# Patient Record
Sex: FEMALE | Race: WHITE | NOT HISPANIC OR LATINO | Employment: OTHER | URBAN - METROPOLITAN AREA
[De-identification: names, ages, dates, MRNs, and addresses within clinical notes are randomized per-mention and may not be internally consistent; named-entity substitution may affect disease eponyms.]

---

## 2017-02-10 ENCOUNTER — HOSPITAL ENCOUNTER (OUTPATIENT)
Dept: RADIOLOGY | Age: 67
Discharge: HOME | End: 2017-02-10
Payer: MEDICARE

## 2017-02-10 DIAGNOSIS — Z12.31 ENCOUNTER FOR SCREENING MAMMOGRAM FOR MALIGNANT NEOPLASM OF BREAST: ICD-10-CM

## 2017-02-10 PROCEDURE — G0202 SCR MAMMO BI INCL CAD: HCPCS

## 2018-01-02 ENCOUNTER — TRANSCRIBE ORDERS (OUTPATIENT)
Dept: ADMINISTRATIVE | Facility: HOSPITAL | Age: 68
End: 2018-01-02

## 2018-01-02 DIAGNOSIS — G43.119 CLASSICAL MIGRAINE WITH INTRACTABLE MIGRAINE: Primary | ICD-10-CM

## 2018-01-11 ENCOUNTER — HOSPITAL ENCOUNTER (OUTPATIENT)
Dept: NON INVASIVE DIAGNOSTICS | Facility: CLINIC | Age: 68
Discharge: HOME | End: 2018-01-11
Payer: MEDICARE

## 2018-01-11 ENCOUNTER — GENERIC CONVERSION - ENCOUNTER (OUTPATIENT)
Dept: OTHER | Facility: OTHER | Age: 68
End: 2018-01-11

## 2018-01-11 DIAGNOSIS — G43.119 CLASSICAL MIGRAINE WITH INTRACTABLE MIGRAINE: ICD-10-CM

## 2018-01-11 PROCEDURE — 93880 EXTRACRANIAL BILAT STUDY: CPT

## 2018-02-14 ENCOUNTER — TRANSCRIBE ORDERS (OUTPATIENT)
Dept: RADIOLOGY | Facility: CLINIC | Age: 68
End: 2018-02-14

## 2018-02-16 ENCOUNTER — HOSPITAL ENCOUNTER (OUTPATIENT)
Dept: RADIOLOGY | Age: 68
Discharge: HOME | End: 2018-02-16
Payer: MEDICARE

## 2018-02-16 DIAGNOSIS — Z12.31 ENCOUNTER FOR SCREENING MAMMOGRAM FOR MALIGNANT NEOPLASM OF BREAST: ICD-10-CM

## 2018-02-16 PROCEDURE — 77067 SCR MAMMO BI INCL CAD: CPT

## 2019-03-08 ENCOUNTER — HOSPITAL ENCOUNTER (OUTPATIENT)
Dept: RADIOLOGY | Age: 69
Discharge: HOME | End: 2019-03-08
Payer: MEDICARE

## 2019-03-08 VITALS — HEIGHT: 68 IN | BODY MASS INDEX: 26.07 KG/M2 | WEIGHT: 172 LBS

## 2019-03-08 DIAGNOSIS — Z12.31 ENCOUNTER FOR SCREENING MAMMOGRAM FOR MALIGNANT NEOPLASM OF BREAST: ICD-10-CM

## 2019-03-08 PROCEDURE — 77067 SCR MAMMO BI INCL CAD: CPT

## 2021-08-25 DIAGNOSIS — E78.5 HYPERLIPIDEMIA, UNSPECIFIED HYPERLIPIDEMIA TYPE: ICD-10-CM

## 2021-08-25 DIAGNOSIS — I10 ESSENTIAL HYPERTENSION: Primary | ICD-10-CM

## 2021-08-25 RX ORDER — METOPROLOL SUCCINATE 25 MG/1
TABLET, EXTENDED RELEASE ORAL
COMMUNITY
Start: 2016-06-29 | End: 2021-08-25 | Stop reason: SDUPTHER

## 2021-08-25 RX ORDER — LOVASTATIN 20 MG/1
20 TABLET ORAL DAILY
Qty: 90 TABLET | Refills: 1 | Status: SHIPPED | OUTPATIENT
Start: 2021-08-25 | End: 2022-02-28 | Stop reason: SDUPTHER

## 2021-08-25 RX ORDER — METOPROLOL SUCCINATE 25 MG/1
25 TABLET, EXTENDED RELEASE ORAL DAILY
Qty: 90 TABLET | Refills: 1 | Status: SHIPPED | OUTPATIENT
Start: 2021-08-25 | End: 2022-02-28 | Stop reason: SDUPTHER

## 2021-08-25 RX ORDER — LOVASTATIN 20 MG/1
1 TABLET ORAL DAILY
COMMUNITY
Start: 2015-06-01 | End: 2021-08-25 | Stop reason: SDUPTHER

## 2021-08-27 ENCOUNTER — TELEPHONE (OUTPATIENT)
Dept: INTERNAL MEDICINE CLINIC | Facility: CLINIC | Age: 71
End: 2021-08-27

## 2021-08-27 NOTE — TELEPHONE ENCOUNTER
Lmom  For pt to call office back  Pt is scheduled for office visit on Monday  Lidia young wanted to see if pt was still having chest pain if so she should go to er

## 2021-08-29 PROBLEM — I10 ESSENTIAL HYPERTENSION: Status: ACTIVE | Noted: 2021-08-29

## 2021-08-29 PROBLEM — E78.2 MIXED HYPERLIPIDEMIA: Status: ACTIVE | Noted: 2021-08-29

## 2021-08-30 ENCOUNTER — VBI (OUTPATIENT)
Dept: ADMINISTRATIVE | Facility: OTHER | Age: 71
End: 2021-08-30

## 2021-08-30 ENCOUNTER — OFFICE VISIT (OUTPATIENT)
Dept: INTERNAL MEDICINE CLINIC | Facility: CLINIC | Age: 71
End: 2021-08-30
Payer: MEDICARE

## 2021-08-30 VITALS
DIASTOLIC BLOOD PRESSURE: 82 MMHG | BODY MASS INDEX: 26.39 KG/M2 | OXYGEN SATURATION: 98 % | SYSTOLIC BLOOD PRESSURE: 130 MMHG | HEIGHT: 69 IN | HEART RATE: 58 BPM | WEIGHT: 178.2 LBS | TEMPERATURE: 98 F

## 2021-08-30 DIAGNOSIS — R73.01 IMPAIRED FASTING GLUCOSE: ICD-10-CM

## 2021-08-30 DIAGNOSIS — E03.4 HYPOTHYROIDISM DUE TO ACQUIRED ATROPHY OF THYROID: ICD-10-CM

## 2021-08-30 DIAGNOSIS — E55.9 VITAMIN D DEFICIENCY: ICD-10-CM

## 2021-08-30 DIAGNOSIS — R00.2 PALPITATIONS: ICD-10-CM

## 2021-08-30 DIAGNOSIS — I10 ESSENTIAL HYPERTENSION: Primary | ICD-10-CM

## 2021-08-30 DIAGNOSIS — F41.9 ANXIETY: ICD-10-CM

## 2021-08-30 DIAGNOSIS — E78.2 MIXED HYPERLIPIDEMIA: ICD-10-CM

## 2021-08-30 PROBLEM — R07.89 ATYPICAL CHEST PAIN: Status: ACTIVE | Noted: 2021-08-30

## 2021-08-30 PROCEDURE — 93000 ELECTROCARDIOGRAM COMPLETE: CPT | Performed by: NURSE PRACTITIONER

## 2021-08-30 PROCEDURE — 99214 OFFICE O/P EST MOD 30 MIN: CPT | Performed by: NURSE PRACTITIONER

## 2021-08-30 RX ORDER — ALPRAZOLAM 0.25 MG/1
0.25 TABLET ORAL 2 TIMES DAILY PRN
Qty: 15 TABLET | Refills: 0 | Status: SHIPPED | OUTPATIENT
Start: 2021-08-30 | End: 2021-12-06

## 2021-08-30 NOTE — ASSESSMENT & PLAN NOTE
BP adequately controlled on current regimen  would recommend continued weight mgmt, avoiding high sodium foods  Continue to check BP periodically in the outpatient setting     Call if SBP remains >150

## 2021-08-30 NOTE — ASSESSMENT & PLAN NOTE
Occurred with palpitations  nonradiating  EKG SB  Refer to cardiology for work up  If recurs would recommend going to ER

## 2021-08-30 NOTE — PATIENT INSTRUCTIONS
Chest Pain   WHAT YOU NEED TO KNOW:   Chest pain can be caused by a range of conditions, from not serious to life-threatening  Chest pain can be a symptom of a digestive problem, such as acid reflux or a stomach ulcer  An anxiety attack or a strong emotion, such as anger, can also cause chest pain  Infection, inflammation, or a fracture in the bones or cartilage in your chest can cause pain or discomfort  Sometimes chest pain or pressure is caused by poor blood flow to your heart (angina)  Chest pain may also be caused by life-threatening conditions such as a heart attack or blood clot in your lungs  DISCHARGE INSTRUCTIONS:   Call your local emergency number (911 in the [address] [facility] [facility]) or have someone call if:   · You have any of the following signs of a heart attack:      ? Squeezing, pressure, or pain in your chest    ? You may  also have any of the following:     § Discomfort or pain in your back, neck, jaw, stomach, or arm    § Shortness of breath    § Nausea or vomiting    § Lightheadedness or a sudden cold sweat      Return to the emergency department if:   · You have chest discomfort that gets worse, even with medicine  · You cough or vomit blood  · Your bowel movements are black or bloody  · You cannot stop vomiting, or it hurts to swallow  Call your doctor if:   · You have questions or concerns about your condition or care  Medicines:   · Medicines  may be given to treat the cause of your chest pain  Examples include pain medicine, anxiety medicine, or medicines to increase blood flow to your heart  · Do not take certain medicines without asking your healthcare provider first   These include NSAIDs, herbal or vitamin supplements, or hormones (estrogen or progestin)  · Take your medicine as directed  Contact your healthcare provider if you think your medicine is not helping or if you have side effects  Tell him or her if you are allergic to any medicine   Keep a list of the medicines, vitamins, and herbs you take  Include the amounts, and when and why you take them  Bring the list or the pill bottles to follow-up visits  Carry your medicine list with you in case of an emergency  Healthy living tips: The following are general healthy guidelines  If the cause of your chest pain is known, your healthcare provider will give you specific guidelines to follow  · Do not smoke  Nicotine and other chemicals in cigarettes and cigars can cause lung and heart damage  Ask your healthcare provider for information if you currently smoke and need help to quit  E-cigarettes or smokeless tobacco still contain nicotine  Talk to your healthcare provider before you use these products  · Choose a variety of healthy foods as often as possible  Include fresh, frozen, or canned fruits and vegetables  Also include low-fat dairy products, fish, chicken (without skin), and lean meats  Your healthcare provider or a dietitian can help you create meal plans  You may need to avoid certain foods or drinks if your pain is caused by a digestion problem  · Lower your sodium (salt) intake  Limit foods that are high in sodium, such as canned foods, salty snacks, and cold cuts  If you add salt when you cook food, do not add more at the table  Choose low-sodium canned foods as much as possible  · Drink plenty of water every day  Water helps your body to control your temperature and blood pressure  Ask your healthcare provider how much water you should drink every day  · Ask about activity  Your healthcare provider will tell you which activities to limit or avoid  Ask when you can drive, return to work, and have sex  Ask about the best exercise plan for you  · Maintain a healthy weight  Ask your healthcare provider what a healthy weight is for you  Ask him or her to help you create a safe weight loss plan if you are overweight  · Ask about vaccines you may need    Get the influenza (flu) vaccine every year as soon as recommended, usually in September or October  You may also need a pneumococcal vaccine to prevent pneumonia  The vaccine is usually given every 5 years, starting at age 72  Your healthcare provider can tell you if should get other vaccines, and when to get them  Follow up with your healthcare provider within 72 hours, or as directed: You may need to return for more tests to find the cause of your chest pain  You may be referred to a specialist, such as a cardiologist or gastroenterologist  Write down your questions so you remember to ask them during your visits  © Copyright [employer] 2021 Information is for End User's use only and may not be sold, redistributed or otherwise used for commercial purposes  All illustrations and images included in CareNotes® are the copyrighted property of [employer]  or Naun Childress  The above information is an  only  It is not intended as medical advice for individual conditions or treatments  Talk to your doctor, nurse or pharmacist before following any medical regimen to see if it is safe and effective for you

## 2021-08-30 NOTE — PROGRESS NOTES
Assessment/Plan:  BMI Counseling: Body mass index is 26 24 kg/m²  The BMI is above normal  Nutrition recommendations include decreasing portion sizes, encouraging healthy choices of fruits and vegetables, consuming healthier snacks and increasing intake of lean protein  Exercise recommendations include moderate physical activity 150 minutes/week, exercising 3-5 times per week and strength training exercises  No pharmacotherapy was ordered  Mixed hyperlipidemia  Check LDL with labs  Cont low fat low cholesterol diet    Essential hypertension  BP adequately controlled on current regimen  would recommend continued weight mgmt, avoiding high sodium foods  Continue to check BP periodically in the outpatient setting  Call if SBP remains >150      Palpitations  EKG with sinus bradycardia  Refer to cardiology for work up    Anxiety  Will give xanax to use sparingly for moderate anxiety  If needs to use frequently will discuss SSRI    Atypical chest pain  Occurred with palpitations  nonradiating  EKG SB  Refer to cardiology for work up  If recurs would recommend going to ER          Problem List Items Addressed This Visit        Cardiovascular and Mediastinum    Essential hypertension - Primary     BP adequately controlled on current regimen  would recommend continued weight mgmt, avoiding high sodium foods  Continue to check BP periodically in the outpatient setting     Call if SBP remains >150           Relevant Orders    CBC and differential    Comprehensive metabolic panel       Other    Mixed hyperlipidemia     Check LDL with labs  Cont low fat low cholesterol diet         Relevant Orders    LDL cholesterol, direct    Palpitations     EKG with sinus bradycardia  Refer to cardiology for work up         Relevant Orders    Ambulatory referral to Cardiology    Anxiety     Will give xanax to use sparingly for moderate anxiety  If needs to use frequently will discuss SSRI         Relevant Medications ALPRAZolam (XANAX) 0 25 mg tablet      Other Visit Diagnoses     Hypothyroidism due to acquired atrophy of thyroid        Relevant Orders    TSH, 3rd generation    Vitamin D deficiency        Relevant Orders    Vitamin D 25 hydroxy    Impaired fasting glucose        Relevant Orders    Hemoglobin A1C    Urinalysis with microscopic            Subjective:      Patient ID: Leatha Sparks is a 70 y o  female  Mj Flannery is here today for complaints of an episode of palpitations and some substernal chest pain  She states that she has been under lot of stress recently due to being named POA of a distant relative  She is attempting to get herself out of the current situation  She states that the episode occurred in the evening and lasted several hours  She did not seek medical attention because she felt it was more related to anxiety  She took half of her 's Klonopin as well as smoked some marijuana and felt that the palpitations resolved and she was able to sleep  She has not had any further recurrence  EKG in the office shows sinus bradycardia  She has not had any recent cardiac workup in due to her age and risk factors I do feel that a Cardiology evaluation is pertinent  I also instructed her that if she did have another episode of this  She should go to the ER for evaluation and not wait  Her blood pressure remains well controlled on her current medication regimen would continue same  Will check an LDL with her lab work  She was provided with a lab slip that she will obtain as soon as possible  I will review those results over the phone  In regards to her anxiety she was provided with a small prescription of Xanax with 15 tablets and 0 refills  If she requires more frequent usage of this, we will discussed SSRI use instead  I did tell her that the benzodiazepine medication can become addictive  She also had complaints of an ocular migraine that also occurred during this time  She did have Dopplers done back in 2018 that had less than 50% bilaterally  She is up-to-date with her vision evaluation  She took ibuprofen and it did resolve      The following portions of the patient's history were reviewed and updated as appropriate: allergies, current medications, past family history, past medical history, past social history, past surgical history and problem list     Review of Systems   Constitutional: Negative for chills and fever  HENT: Negative for ear pain and sore throat  Eyes: Negative for pain and visual disturbance  Respiratory: Negative for cough and shortness of breath  Cardiovascular: Positive for chest pain and palpitations  Gastrointestinal: Negative for abdominal pain and vomiting  Genitourinary: Negative for dysuria and hematuria  Musculoskeletal: Negative for arthralgias and back pain  Skin: Negative for color change and rash  Neurological: Negative for seizures and syncope  Psychiatric/Behavioral: The patient is nervous/anxious  All other systems reviewed and are negative  Objective:      /82 (BP Location: Left arm, Patient Position: Sitting, Cuff Size: Standard)   Pulse 58   Temp 98 °F (36 7 °C) (Tympanic)   Ht 5' 9 09" (1 755 m)   Wt 80 8 kg (178 lb 3 2 oz)   SpO2 98% Comment: room air  BMI 26 24 kg/m²          Physical Exam  Constitutional:       General: She is not in acute distress  Appearance: She is well-developed  HENT:      Head: Normocephalic and atraumatic  Eyes:      General: No scleral icterus  Conjunctiva/sclera: Conjunctivae normal       Pupils: Pupils are equal, round, and reactive to light  Neck:      Trachea: No tracheal deviation  Cardiovascular:      Rate and Rhythm: Normal rate and regular rhythm  Heart sounds: No murmur heard  No friction rub  Pulmonary:      Effort: Pulmonary effort is normal  No respiratory distress  Breath sounds: No stridor  No wheezing or rales     Abdominal: General: Bowel sounds are normal       Palpations: Abdomen is soft  Tenderness: There is no abdominal tenderness  There is no guarding or rebound  Musculoskeletal:         General: Normal range of motion  Cervical back: Neck supple  Lymphadenopathy:      Cervical: No cervical adenopathy  Skin:     General: Skin is warm and dry  Findings: No rash  Neurological:      Mental Status: She is alert and oriented to person, place, and time  Cranial Nerves: No cranial nerve deficit

## 2021-10-27 ENCOUNTER — TELEPHONE (OUTPATIENT)
Dept: INTERNAL MEDICINE CLINIC | Facility: CLINIC | Age: 71
End: 2021-10-27

## 2021-11-03 ENCOUNTER — TELEPHONE (OUTPATIENT)
Dept: INTERNAL MEDICINE CLINIC | Facility: CLINIC | Age: 71
End: 2021-11-03

## 2021-11-12 ENCOUNTER — RA CDI HCC (OUTPATIENT)
Dept: OTHER | Facility: HOSPITAL | Age: 71
End: 2021-11-12

## 2021-11-19 ENCOUNTER — OFFICE VISIT (OUTPATIENT)
Dept: INTERNAL MEDICINE CLINIC | Facility: CLINIC | Age: 71
End: 2021-11-19
Payer: MEDICARE

## 2021-11-19 VITALS
HEIGHT: 69 IN | TEMPERATURE: 97.7 F | DIASTOLIC BLOOD PRESSURE: 80 MMHG | BODY MASS INDEX: 26.16 KG/M2 | WEIGHT: 176.6 LBS | SYSTOLIC BLOOD PRESSURE: 120 MMHG | HEART RATE: 84 BPM | OXYGEN SATURATION: 98 %

## 2021-11-19 DIAGNOSIS — F41.9 ANXIETY: ICD-10-CM

## 2021-11-19 DIAGNOSIS — E78.2 MIXED HYPERLIPIDEMIA: ICD-10-CM

## 2021-11-19 DIAGNOSIS — Z11.59 NEED FOR HEPATITIS C SCREENING TEST: ICD-10-CM

## 2021-11-19 DIAGNOSIS — Z23 NEED FOR VACCINATION WITH 13-POLYVALENT PNEUMOCOCCAL CONJUGATE VACCINE: ICD-10-CM

## 2021-11-19 DIAGNOSIS — R00.2 PALPITATIONS: ICD-10-CM

## 2021-11-19 DIAGNOSIS — I10 ESSENTIAL HYPERTENSION: ICD-10-CM

## 2021-11-19 DIAGNOSIS — M85.88 OSTEOPENIA OF LUMBAR SPINE: ICD-10-CM

## 2021-11-19 PROCEDURE — 99214 OFFICE O/P EST MOD 30 MIN: CPT | Performed by: NURSE PRACTITIONER

## 2021-11-19 PROCEDURE — 90670 PCV13 VACCINE IM: CPT

## 2021-11-19 PROCEDURE — G0009 ADMIN PNEUMOCOCCAL VACCINE: HCPCS

## 2021-12-06 ENCOUNTER — CONSULT (OUTPATIENT)
Dept: CARDIOLOGY CLINIC | Facility: CLINIC | Age: 71
End: 2021-12-06
Payer: MEDICARE

## 2021-12-06 VITALS
BODY MASS INDEX: 26.25 KG/M2 | HEIGHT: 69 IN | WEIGHT: 177.2 LBS | SYSTOLIC BLOOD PRESSURE: 122 MMHG | HEART RATE: 76 BPM | DIASTOLIC BLOOD PRESSURE: 86 MMHG

## 2021-12-06 DIAGNOSIS — E78.2 MIXED HYPERLIPIDEMIA: ICD-10-CM

## 2021-12-06 DIAGNOSIS — I10 ESSENTIAL HYPERTENSION: Primary | ICD-10-CM

## 2021-12-06 DIAGNOSIS — R00.2 PALPITATIONS: ICD-10-CM

## 2021-12-06 PROCEDURE — 93000 ELECTROCARDIOGRAM COMPLETE: CPT | Performed by: INTERNAL MEDICINE

## 2021-12-06 PROCEDURE — 99204 OFFICE O/P NEW MOD 45 MIN: CPT | Performed by: INTERNAL MEDICINE

## 2022-01-04 ENCOUNTER — HOSPITAL ENCOUNTER (OUTPATIENT)
Dept: NON INVASIVE DIAGNOSTICS | Facility: CLINIC | Age: 72
Discharge: HOME | End: 2022-01-04
Payer: MEDICARE

## 2022-01-04 VITALS
WEIGHT: 177 LBS | DIASTOLIC BLOOD PRESSURE: 86 MMHG | HEIGHT: 69 IN | SYSTOLIC BLOOD PRESSURE: 122 MMHG | HEART RATE: 76 BPM | BODY MASS INDEX: 26.22 KG/M2

## 2022-01-04 DIAGNOSIS — I10 ESSENTIAL HYPERTENSION: ICD-10-CM

## 2022-01-04 DIAGNOSIS — R00.2 PALPITATIONS: ICD-10-CM

## 2022-01-04 LAB
AORTIC ROOT: 2.9 CM
APICAL FOUR CHAMBER EJECTION FRACTION: 68 %
ASCENDING AORTA: 3.5 CM
E WAVE DECELERATION TIME: 367 MS
FRACTIONAL SHORTENING: 42 % (ref 28–44)
INTERVENTRICULAR SEPTUM IN DIASTOLE (PARASTERNAL SHORT AXIS VIEW): 0.9 CM
LEFT ATRIUM AREA SYSTOLE SINGLE PLANE A4C: 13.1 CM2
LEFT INTERNAL DIMENSION IN SYSTOLE: 3.2 CM (ref 2.1–4)
LEFT VENTRICULAR INTERNAL DIMENSION IN DIASTOLE: 5.5 CM (ref 4.73–7.05)
LEFT VENTRICULAR POSTERIOR WALL IN END DIASTOLE: 0.9 CM
LEFT VENTRICULAR STROKE VOLUME: 78 ML
MV E'TISSUE VEL-SEP: 8 CM/S
MV PEAK A VEL: 0.64 M/S
MV PEAK E VEL: 51 CM/S
MV STENOSIS PRESSURE HALF TIME: 0 MS
RIGHT ATRIUM AREA SYSTOLE A4C: 10.7 CM2
RIGHT VENTRICLE ID DIMENSION: 3.1 CM
SL CV LV EF: 55
SL CV PED ECHO LEFT VENTRICLE DIASTOLIC VOLUME (MOD BIPLANE) 2D: 119 ML
SL CV PED ECHO LEFT VENTRICLE SYSTOLIC VOLUME (MOD BIPLANE) 2D: 40 ML
TR MAX PG: 16 MMHG
TRICUSPID VALVE PEAK REGURGITATION VELOCITY: 2.13 M/S
TRICUSPID VALVE S': 0.5 CM/S
TV PEAK GRADIENT: 18 MMHG
Z-SCORE OF LEFT VENTRICULAR DIMENSION IN END SYSTOLE: -0.48

## 2022-01-04 PROCEDURE — 93306 TTE W/DOPPLER COMPLETE: CPT | Performed by: INTERNAL MEDICINE

## 2022-01-04 PROCEDURE — 93306 TTE W/DOPPLER COMPLETE: CPT

## 2022-02-28 DIAGNOSIS — I10 ESSENTIAL HYPERTENSION: ICD-10-CM

## 2022-02-28 DIAGNOSIS — E78.5 HYPERLIPIDEMIA, UNSPECIFIED HYPERLIPIDEMIA TYPE: ICD-10-CM

## 2022-02-28 RX ORDER — LOVASTATIN 20 MG/1
20 TABLET ORAL DAILY
Qty: 90 TABLET | Refills: 1 | Status: SHIPPED | OUTPATIENT
Start: 2022-02-28

## 2022-02-28 RX ORDER — METOPROLOL SUCCINATE 25 MG/1
25 TABLET, EXTENDED RELEASE ORAL DAILY
Qty: 90 TABLET | Refills: 1 | Status: SHIPPED | OUTPATIENT
Start: 2022-02-28

## 2022-09-12 DIAGNOSIS — I10 ESSENTIAL HYPERTENSION: ICD-10-CM

## 2022-09-12 DIAGNOSIS — E78.5 HYPERLIPIDEMIA, UNSPECIFIED HYPERLIPIDEMIA TYPE: ICD-10-CM

## 2022-09-12 RX ORDER — LOVASTATIN 20 MG/1
20 TABLET ORAL DAILY
Qty: 90 TABLET | Refills: 0 | Status: SHIPPED | OUTPATIENT
Start: 2022-09-12

## 2022-09-12 RX ORDER — METOPROLOL SUCCINATE 25 MG/1
25 TABLET, EXTENDED RELEASE ORAL DAILY
Qty: 90 TABLET | Refills: 0 | Status: SHIPPED | OUTPATIENT
Start: 2022-09-12

## 2022-10-05 ENCOUNTER — RA CDI HCC (OUTPATIENT)
Dept: OTHER | Facility: HOSPITAL | Age: 72
End: 2022-10-05

## 2022-10-05 NOTE — PROGRESS NOTES
[facility]  coding opportunities       Chart reviewed, no opportunity found: CHART REVIEWED, NO OPPORTUNITY FOUND        Patients Insurance        Commercial Insurance: [address] [place] [address]

## 2022-10-31 ENCOUNTER — OFFICE VISIT (OUTPATIENT)
Dept: INTERNAL MEDICINE CLINIC | Facility: CLINIC | Age: 72
End: 2022-10-31

## 2022-10-31 VITALS
WEIGHT: 177.2 LBS | OXYGEN SATURATION: 98 % | HEIGHT: 69 IN | BODY MASS INDEX: 26.25 KG/M2 | HEART RATE: 57 BPM | SYSTOLIC BLOOD PRESSURE: 136 MMHG | DIASTOLIC BLOOD PRESSURE: 80 MMHG | TEMPERATURE: 98.4 F

## 2022-10-31 DIAGNOSIS — F41.9 ANXIETY: ICD-10-CM

## 2022-10-31 DIAGNOSIS — Z23 NEED FOR INFLUENZA VACCINATION: ICD-10-CM

## 2022-10-31 DIAGNOSIS — M85.89 OSTEOPENIA OF MULTIPLE SITES: ICD-10-CM

## 2022-10-31 DIAGNOSIS — I10 ESSENTIAL HYPERTENSION: ICD-10-CM

## 2022-10-31 DIAGNOSIS — Z12.11 SCREENING FOR COLON CANCER: ICD-10-CM

## 2022-10-31 DIAGNOSIS — Z12.31 ENCOUNTER FOR SCREENING MAMMOGRAM FOR MALIGNANT NEOPLASM OF BREAST: Primary | ICD-10-CM

## 2022-10-31 DIAGNOSIS — E78.2 MIXED HYPERLIPIDEMIA: ICD-10-CM

## 2022-10-31 NOTE — ASSESSMENT & PLAN NOTE
Due for updated lipid panel  Continue lovastatin  Recommend healthy lifestyle choices for your cholesterol  Low fat/low cholesterol diet  Limit/avoid red meat  Eat more lean meat - chicken breast, ground turkey, fish  Exercise 30 mins at least 5 times a week as tolerated

## 2022-10-31 NOTE — ASSESSMENT & PLAN NOTE
Well controlled on metoprolol  Continue current regimen -   Continue to monitor blood pressure at home  Goal BP is < 130/80  Contact our office for consistent elevations  Recommend low sodium diet  Exercise 30 minutes 5 times a week as tolerated    Recommend yearly eye exam

## 2022-10-31 NOTE — PROGRESS NOTES
Assessment/Plan:    Anxiety  Uses xanax very rarely  Essential hypertension  Well controlled on metoprolol  Continue current regimen -   Continue to monitor blood pressure at home  Goal BP is < 130/80  Contact our office for consistent elevations  Recommend low sodium diet  Exercise 30 minutes 5 times a week as tolerated  Recommend yearly eye exam        Osteopenia  Follows Rheumatology  Mixed hyperlipidemia  Due for updated lipid panel  Continue lovastatin  Recommend healthy lifestyle choices for your cholesterol  Low fat/low cholesterol diet  Limit/avoid red meat  Eat more lean meat - chicken breast, ground turkey, fish  Exercise 30 mins at least 5 times a week as tolerated  BMI Counseling: Body mass index is 25 89 kg/m²  The BMI is above normal  Nutrition recommendations include decreasing portion sizes, encouraging healthy choices of fruits and vegetables, decreasing fast food intake, consuming healthier snacks, limiting drinks that contain sugar, moderation in carbohydrate intake, increasing intake of lean protein, reducing intake of saturated and trans fat and reducing intake of cholesterol  Exercise recommendations include moderate physical activity 150 minutes/week and exercising 3-5 times per week  Rationale for BMI follow-up plan is due to patient being overweight or obese  Depression Screening and Follow-up Plan: Patient was screened for depression during today's encounter  They screened negative with a PHQ-2 score of 0  Diagnoses and all orders for this visit:    Encounter for screening mammogram for malignant neoplasm of breast  -     Mammo screening bilateral w 3d & cad; Future    Need for influenza vaccination  -     influenza vaccine, high-dose, PF 0 7 mL (FLUZONE HIGH-DOSE)    Screening for colon cancer  -     Ambulatory Referral to Gastroenterology; Future    Mixed hyperlipidemia  -     Lipid panel;  Future    Essential hypertension  -     CBC and differential; Future  -     Comprehensive metabolic panel; Future    Anxiety    Osteopenia of multiple sites          Subjective:      Patient ID: Bry Griffith is a 67 y o  female  Monie Ugalde is here today for follow up  Overall she is doing well with no new concerns  Her labs were reviewed in detail and were all within normal limits  HLD- take lovastatin with no side effects, , due for lipid panel  HTN- controlled on metoprolol, denies CP, headaches and changes of vision, denies side effects with medciation     Osteopenia- follows rheumatology    Due for mammogram she reports she will schedule, will be due for colonoscopy April 2023 script given        The following portions of the patient's history were reviewed and updated as appropriate: allergies, current medications, past family history, past medical history, past social history, past surgical history and problem list     Review of Systems   Constitutional: Negative for activity change, appetite change, chills, diaphoresis and fever  HENT: Negative for congestion, ear discharge, ear pain, postnasal drip, rhinorrhea, sinus pressure, sinus pain and sore throat  Eyes: Negative for pain, discharge, itching and visual disturbance  Respiratory: Negative for cough, chest tightness, shortness of breath and wheezing  Cardiovascular: Negative for chest pain, palpitations and leg swelling  Gastrointestinal: Negative for abdominal pain, constipation, diarrhea, nausea and vomiting  Endocrine: Negative for polydipsia, polyphagia and polyuria  Genitourinary: Negative for difficulty urinating, dysuria and urgency  Musculoskeletal: Negative for arthralgias, back pain and neck pain  Skin: Negative for rash and wound  Neurological: Negative for dizziness, weakness, numbness and headaches           Past Medical History:   Diagnosis Date   • Anxiety disorder    • Basal cell carcinoma     leg and nose   • Basal cell carcinoma (BCC) of back    • Diverticulosis large intestine w/o perforation or abscess w/o bleeding    • Hyperlipidemia    • Hypertension    • Microscopic hematuria    • Osteoarthritis    • Osteopenia of multiple sites    • Retinal tear of left eye          Current Outpatient Medications:   •  Calcium Carb-Cholecalciferol (CALCIUM 1000 + D PO), Take 2,000 mg by mouth, Disp: , Rfl:   •  lovastatin (MEVACOR) 20 mg tablet, Take 1 tablet (20 mg total) by mouth daily, Disp: 90 tablet, Rfl: 0  •  metoprolol succinate (TOPROL-XL) 25 mg 24 hr tablet, Take 1 tablet (25 mg total) by mouth daily, Disp: 90 tablet, Rfl: 0    No Known Allergies    Social History   Past Surgical History:   Procedure Laterality Date   • COLONOSCOPY  04/2018   • HYSTERECTOMY      age 72   • MAMMO (HISTORICAL)  2019   • OOPHORECTOMY Bilateral     age 72   • TONSILLECTOMY       Family History   Problem Relation Age of Onset   • Multiple sclerosis Mother    • Depression Mother    • Completed Suicide  Mother    • Heart disease Father    • Diabetes Father    • Thyroid disease unspecified Sister    • Breast cancer Paternal Grandmother 76   • Hypertension Other    • Arthritis Other         DDD sister       Objective:  /80   Pulse 57   Temp 98 4 °F (36 9 °C) (Temporal)   Ht 5' 9 37" (1 762 m)   Wt 80 4 kg (177 lb 3 2 oz)   SpO2 98%   BMI 25 89 kg/m²     No results found for this or any previous visit (from the past 1344 hour(s))  Physical Exam  Constitutional:       General: She is not in acute distress  Appearance: She is well-developed  She is not diaphoretic  HENT:      Head: Normocephalic and atraumatic  Right Ear: External ear normal       Left Ear: External ear normal       Nose: Nose normal       Mouth/Throat:      Pharynx: No oropharyngeal exudate  Eyes:      General:         Right eye: No discharge  Left eye: No discharge        Conjunctiva/sclera: Conjunctivae normal       Pupils: Pupils are equal, round, and reactive to light    Neck:      Thyroid: No thyromegaly  Cardiovascular:      Rate and Rhythm: Normal rate and regular rhythm  Heart sounds: Normal heart sounds  No murmur heard  No friction rub  No gallop  Pulmonary:      Effort: Pulmonary effort is normal  No respiratory distress  Breath sounds: Normal breath sounds  No stridor  No wheezing or rales  Abdominal:      General: Bowel sounds are normal  There is no distension  Palpations: Abdomen is soft  Tenderness: There is no abdominal tenderness  Musculoskeletal:      Cervical back: Normal range of motion and neck supple  Lymphadenopathy:      Cervical: No cervical adenopathy  Skin:     General: Skin is warm and dry  Findings: No erythema or rash  Neurological:      Mental Status: She is alert and oriented to person, place, and time  Psychiatric:         Behavior: Behavior normal          Thought Content:  Thought content normal          Judgment: Judgment normal

## 2022-12-09 DIAGNOSIS — E78.5 HYPERLIPIDEMIA, UNSPECIFIED HYPERLIPIDEMIA TYPE: ICD-10-CM

## 2022-12-09 DIAGNOSIS — I10 ESSENTIAL HYPERTENSION: ICD-10-CM

## 2022-12-09 RX ORDER — METOPROLOL SUCCINATE 25 MG/1
25 TABLET, EXTENDED RELEASE ORAL DAILY
Qty: 90 TABLET | Refills: 1 | Status: SHIPPED | OUTPATIENT
Start: 2022-12-09

## 2022-12-09 RX ORDER — LOVASTATIN 20 MG/1
20 TABLET ORAL DAILY
Qty: 90 TABLET | Refills: 1 | Status: SHIPPED | OUTPATIENT
Start: 2022-12-09

## 2022-12-18 PROBLEM — M15.0 PRIMARY GENERALIZED (OSTEO)ARTHRITIS: Status: ACTIVE | Noted: 2022-12-18

## 2023-01-30 ENCOUNTER — TELEPHONE (OUTPATIENT)
Age: 73
End: 2023-01-30

## 2023-01-30 DIAGNOSIS — K57.90 DIVERTICULOSIS: Primary | ICD-10-CM

## 2023-01-30 NOTE — TELEPHONE ENCOUNTER
Patient called to schedule recall colonoscopy  Last colonoscopy was done 4/19/2018  with Dr Morales Crenshaw  Patient information was verified, no issues  Please call to schedule

## 2023-01-30 NOTE — LETTER
[facility]  [address] [id number]      FLEXIBLE COLONOSCOPY INSTRUCTIONS  PLEASE NOTE    AS OF JUNE 1, 2014, OUR OFFICE REQUIRES 72 HOURS NOTICE OF CANCELLATION/RESCHEDULE OF A PROCEDURE TO AVOID INCURRING A MISSED APPOINTMENT FEE  Your Colonoscopy Procedure has been scheduled at:  [id number] [place], [place] [phone]    The Date of your Procedure is: May 1, 2023  Dr Miguel Feldman  will be performing the procedure  [facility]  will call the day prior to the procedure with the arrival and procedure times  The total time at the facility will be approximately 1 1/2 hours  Please bring to your procedure at [facility] :   1  Insurance Cards and referrals if required by [employer] company   2  Valid Photo ID   3  Power of  Form if required  Use the bowel preparation as directed  Check with your family doctor if you are taking a blood thinner (Coumadin, Plavix, Xarelto, Pradaxa, Gingko biloba, Ginseng, Feverfew, St  Jonathan's Wort)  We suggest stopping these for 3 days  Special instructions may be needed if you are taking aspirin or any aspirin-containing medication  Check with your family physician  If you are on DIABETIC MEDICATION (tablets or insulin) your doctor may make changes in your preparation  Take all medications usual unless otherwise instructed  Feel free to call the physician's office to answer any questions or address any concerns you have regarding your procedure or preparation  A nurse will be happy to assist you  Because anesthesia is given to you during the procedure, the center requires that you be discharged under supervision of an adult to take you home after the procedure is performed  They will also need to sign as a witness on your discharge instructions  Public Transportation such as VAST, BUS, TAXI is Not Acceptable      It is important you notify our office of any insurance changes prior to your procedure and, if necessary, supply us with referrals from your primary care physician  COLONOSCOPY PREPARATION INSTRUCTIONS    Purchase (prescription not required):  · 238 gram bottle of Miralax® (Glycolax®)  · 4 Dulcolax® (Bisacodyl) Laxative Tablets  · 64 oz  bottle of Gatorade® or your preference of a non-carbonated clear liquid - NOT RED OR PURPLE     One Day Prior to Colonoscopy Procedure  · Nothing to eat the day before your procedure, only clear liquids  · It is important that you drink plenty of clear liquids throughout the day to prevent dehydration  Clear Liquids include:  o Water/Iced Tea/Lemonade/Gatorade®/Black Coffee or tea (no milk or creamers  o Soft drinks: orange, ginger ale, cola, Pepsi®, Sprite®, 7Up®  o Jer-Aid® (lemonade or orange flavors only)  o Strained fruit juices without pulp such as apple, white grape, white cranberry  o Jell-O®, lemon, lime or orange (no fruit or toppings)  o Popsicles, [place] Ice (No Ice Cream, sherbets, or fruit bars)  o Chicken or beef bouillon/broth  DO NOT EAT OR DRINK ANYTHING RED OR PURPLE  DO NOT DRINK ANY ALCOHOLIC BEVERAGES  DIABETIC PATIENTS: Consult your physician    At 4:00 pm, take (2) Dulcolax® (Bisacodyl) Laxative Tablets  Swallow the tablets whole with an 8 oz  glass of water  At 8:00 pm, take the additional (2) Dulcolax® (Bisacodyl) Laxative Tablets with 8 oz  of water  The package may direct you not to exceed (2) tablets at any time but for the purpose of this examination, you should take (4) total     Mix the 238 gm of Miralax® in 64 oz  of Gatorade® and shake the solution until the Miralax® is dissolved  You will drink half (32 oz) of this solution this evening, beginning between 4 and 6 o'clock  Drink 8 oz glassfuls at your own pace  It may take several hours to drink the solution  Remember to stay close to toilet facilities      DAY OF COLONOSCOPY PROCEDURE    Five (5) hours before your procedure, drink the other half (32 oz) of the Miralax®/Gatorade® mixture within a two (2) hour period  This may require you to get up very early if you are scheduled for an early procedure  NOTHING IS TO BE TAKEN BY MOUTH 3 HOURS PRIOR TO PROCEDURE  If you use an inhaler, please bring it with you to your procedure

## 2023-02-07 NOTE — TELEPHONE ENCOUNTER
Attempted to contact patient to schedule colonoscopy, no answer  Left v/m requesting call back  Waiting on response from patient        TR 5 yr recall colonoscopy B[facility] 4/19/2018 mild diverticulosis,

## 2023-02-09 NOTE — TELEPHONE ENCOUNTER
TR 5 yr recall colonoscopy BEC 4/19/2018 mild diverticulosis,     BEC  5/1/2023   Mailed information to pt    Additional Information  • Negative: Is this a new patient under the age of 48? • Negative: Is this a patient over the age of 76 that has never had a colonoscopy? • Negative: Is this patient over the age of [de-identified] with a history of cancer and/or polyps? • Negative: Has the patient had a colonoscopy within the last year and when was it? • Negative: Does the patient have a family history of colon or rectal cancer? • Negative: Does the patient experience chest pain or shortness of breath when climbing stairs? • Negative: Does the patient require oxygen support? • Negative: Has the patient had a cardiac procedure within the last year? • Negative: Has the patient had coronary artery disease (CAD) with stents or myocardial infarction in the last three months? • Negative: Does the patient have ongoing cardiac ischemia, severe valve dysfunction, or severe reduction of ejection fraction? • Negative: Has the patient had a stroke or blood clots? • Negative: Has the patient had a transient ischemic attack (TIA) or cerebrovascular accident (CVA) in the last three months? • Negative: Is the patient currently on any blood thinners such as Coumadin, Plavix, Eliquis, Pradaxa, Xarelto, etc?  (Check the patient's current medication list and document reason for taking medication)  • Negative: Has the patient had a knee or hip replacement within the last 6 months that required antibiotic coverage prior to the procedure? • Negative: Advised Patient - Initial Assessment  1  Patient advised that our office requires 72 hours notice for a cancellation of a procedure to avoid incurring a missed appointment fee  2  Asked patient to please contact insurance company to ask how they will be processing the individual claim for the procedure    3  Advised patient that she is welcome to see the doctor in the office prior to the procedure  4  Advised patient to be at the location of the procedure at 30 minutes prior to the scheduled time      Protocols used: CESIA AMB CRC COLONOSCOPY SCHEDULING

## 2023-05-02 ENCOUNTER — TELEPHONE (OUTPATIENT)
Dept: ADMINISTRATIVE | Facility: OTHER | Age: 73
End: 2023-05-02

## 2023-05-02 NOTE — TELEPHONE ENCOUNTER
----- Message from [facility] sent at 5/2/2023  6:52 AM EDT -----  05/02/23 6:52 AM    Hello, our patient Naveen Hay has had CRC: Colonoscopy completed/performed  Please assist in updating the patient chart by making an External outreach to Dr Edgar Chiu facility located in [place]  The date of service is 5/1/2023      Thank you,  [address] [facility]

## 2023-05-02 NOTE — TELEPHONE ENCOUNTER
Upon review of the In Basket request we have found/obtained the documentation  The status of this report is in progress/pending/awaiting final  Due to protocols, we are unable to hold requests for resulting/linking of a pending/ in progress/awaiting final items and are unable to proceed  Any additional questions or concerns should be emailed to the Practice Liaisons via the appropriate education email address, please do not reply via In Basket      Thank you  Gabby Jackson

## 2023-05-11 ENCOUNTER — OFFICE VISIT (OUTPATIENT)
Dept: INTERNAL MEDICINE CLINIC | Facility: CLINIC | Age: 73
End: 2023-05-11

## 2023-05-11 VITALS
WEIGHT: 180.6 LBS | SYSTOLIC BLOOD PRESSURE: 130 MMHG | TEMPERATURE: 98.6 F | DIASTOLIC BLOOD PRESSURE: 80 MMHG | OXYGEN SATURATION: 9 % | HEART RATE: 66 BPM | HEIGHT: 69 IN | BODY MASS INDEX: 26.75 KG/M2

## 2023-05-11 DIAGNOSIS — F41.9 ANXIETY: ICD-10-CM

## 2023-05-11 DIAGNOSIS — I10 ESSENTIAL HYPERTENSION: ICD-10-CM

## 2023-05-11 DIAGNOSIS — R55 VASOVAGAL SYNCOPE: Primary | ICD-10-CM

## 2023-05-11 RX ORDER — ALPRAZOLAM 0.25 MG/1
0.25 TABLET ORAL DAILY PRN
Qty: 30 TABLET | Refills: 0 | Status: SHIPPED | OUTPATIENT
Start: 2023-05-11

## 2023-05-11 NOTE — ASSESSMENT & PLAN NOTE
- situational  - restart xanax 1/2-1 tablet daily as needed  - advised to not combine xanax with alcohol or marijuana   - controlled substance agreement signed today

## 2023-05-11 NOTE — PROGRESS NOTES
Assessment/Plan:    Problem List Items Addressed This Visit        Cardiovascular and Mediastinum    Essential hypertension     - controlled on Toprol XL 25mg daily   - discussed adequate hydration and changing positions slowly            Other    Anxiety     - situational  - restart xanax 1/2-1 tablet daily as needed  - advised to not combine xanax with alcohol or marijuana   - controlled substance agreement signed today          Relevant Medications    ALPRAZolam (XANAX) 0 25 mg tablet    Syncope - Primary     - likely secondary to dehydration, see HPI for detail of event  - occurred after long day of being in the [place] sun, minimal fluids, +alcohol and minimal food  - event occurred over a month ago  She denies any episodes since  - she did strike her head and did not seek medical evaluation until now  Neuro exam is unremarkable  She is asymptomatic  No asa or anticoagulants   - discussed importance of staying well hydrated and eating regularly throughout the day  Change positions slowly while on Toprol XL  - follow up for any new symptoms             BMI Counseling: Body mass index is 26 39 kg/m²  M*Modal software was used to dictate this note  It may contain errors with dictating incorrect words or incorrect spelling  Please contact the provider directly with any questions  Subjective:      Patient ID: Bobby Corona is a 68 y o  female  HPI     Patient presents today with concern for fall over a month ago and increased anxiety  Fall - she traveled to [place] April 3rd over 1 month ago with 2 friends  She states they arrived early around 9am and spent the day walking around in the sun  She had 2 alcoholic drinks and soda, very little to eat throughout the day  They weren't able to check in to their house until 5 pm  She states she was sitting in their house and went to stand up and felt lightheaded   She states she didn't think much of it and continued to walk into the kitchen and then collapsed backwards, hitting her head on a wooden floor  She states she immediately came to  Her friends were there with her  She was able to eat and drink afterwards which improved her symptoms  She states it was very hot while she was down there and started eating more throughout the trip and has had no symptoms since  She states she monitored herself, she had no headache, no vision changes, no weakness or change in speech  Since the fall no episodes of fainting, dizziness or lightheadedness  No chest pain or palpitations    Anxiety - she has a hx of situational anxiety  Recently she feels her anxiety has been worsened  Unfortunately while she was in [place] her friend that she was traveling with had a stroke while they were watching a baseball game  She had to get the paramedics and her friend was admitted to the hospital  She states that ever since she has been home from the trip she has felt her anxiety more heightened  Another good lifelong friend is going through a struggle with breast cancer  She has been on xanax in the past and is asking for a small supply  She is also inquiring about medical marijuana she feels that marijuana has helped her in the past as well  The following portions of the patient's history were reviewed and updated as appropriate: allergies, current medications, past family history, past medical history, past social history, past surgical history and problem list     Review of Systems   Constitutional: Negative for activity change, appetite change, fatigue, fever and unexpected weight change  Eyes: Negative for visual disturbance  Respiratory: Negative for cough, chest tightness, shortness of breath and wheezing  Cardiovascular: Negative for chest pain, palpitations and leg swelling  Musculoskeletal: Negative for neck pain and neck stiffness  Neurological: Positive for syncope (no rucurrent episodes)   Negative for dizziness, speech difficulty, weakness and "light-headedness  Psychiatric/Behavioral: Negative for confusion and dysphoric mood  The patient is nervous/anxious  Past Medical History:   Diagnosis Date   • Anxiety disorder    • Basal cell carcinoma     leg and nose   • Basal cell carcinoma (BCC) of back    • Diverticulosis large intestine w/o perforation or abscess w/o bleeding    • Hyperlipidemia    • Hypertension    • Microscopic hematuria    • Osteoarthritis    • Osteopenia of multiple sites    • Retinal tear of left eye          Current Outpatient Medications:   •  ALPRAZolam (XANAX) 0 25 mg tablet, Take 1 tablet (0 25 mg total) by mouth daily as needed for anxiety Or 1/2 tablet, Disp: 30 tablet, Rfl: 0  •  Calcium Carb-Cholecalciferol (CALCIUM 1000 + D PO), Take 2,000 mg by mouth, Disp: , Rfl:   •  lovastatin (MEVACOR) 20 mg tablet, Take 1 tablet (20 mg total) by mouth daily, Disp: 90 tablet, Rfl: 1  •  metoprolol succinate (TOPROL-XL) 25 mg 24 hr tablet, Take 1 tablet (25 mg total) by mouth daily, Disp: 90 tablet, Rfl: 1    No Known Allergies    Social History   Past Surgical History:   Procedure Laterality Date   • COLONOSCOPY  04/2018   • HYSTERECTOMY      age 72   • MAMMO (HISTORICAL)  2019   • OOPHORECTOMY Bilateral     age 72   • TONSILLECTOMY       Family History   Problem Relation Age of Onset   • Multiple sclerosis Mother    • Depression Mother    • Completed Suicide  Mother    • Heart disease Father    • Diabetes Father    • Thyroid disease unspecified Sister    • Breast cancer Paternal Grandmother 76   • Hypertension Other    • Arthritis Other         DDD sister       Objective:  /80 (BP Location: Left arm, Patient Position: Sitting, Cuff Size: Standard)   Pulse 66   Temp 98 6 °F (37 °C) (Temporal)   Ht 5' 9 37\" (1 762 m)   Wt 81 9 kg (180 lb 9 6 oz)   SpO2 (!) 9%   BMI 26 39 kg/m²      Physical Exam  Vitals reviewed  Constitutional:       General: She is not in acute distress  Appearance: Normal appearance   She is " normal weight  She is not diaphoretic  HENT:      Head: Normocephalic and atraumatic  Eyes:      Extraocular Movements: Extraocular movements intact  Conjunctiva/sclera: Conjunctivae normal       Pupils: Pupils are equal, round, and reactive to light  Neck:      Vascular: No carotid bruit  Cardiovascular:      Rate and Rhythm: Normal rate and regular rhythm  Heart sounds: Normal heart sounds  No murmur heard  Pulmonary:      Effort: Pulmonary effort is normal  No respiratory distress  Breath sounds: Normal breath sounds  No wheezing, rhonchi or rales  Musculoskeletal:      Cervical back: Neck supple  Neurological:      Mental Status: She is alert and oriented to person, place, and time  Mental status is at baseline  Cranial Nerves: Cranial nerves 2-12 are intact  No cranial nerve deficit, dysarthria or facial asymmetry  Motor: Tremor (chronic essential tremor) present  No weakness, abnormal muscle tone or pronator drift  Coordination: Coordination is intact  Romberg sign negative  Coordination normal  Finger-Nose-Finger Test and Heel to [facility] [[email or web address]] Test normal  Rapid alternating movements normal       Gait: Gait is intact  Gait and tandem walk normal    Psychiatric:         Mood and Affect: Mood normal          Behavior: Behavior normal          Thought Content:  Thought content normal          Judgment: Judgment normal

## 2023-05-11 NOTE — ASSESSMENT & PLAN NOTE
- likely secondary to dehydration, see HPI for detail of event  - occurred after long day of being in the [place] sun, minimal fluids, +alcohol and minimal food  - event occurred over a month ago  She denies any episodes since  - she did strike her head and did not seek medical evaluation until now  Neuro exam is unremarkable  She is asymptomatic  No asa or anticoagulants   - discussed importance of staying well hydrated and eating regularly throughout the day   Change positions slowly while on Toprol XL  - follow up for any new symptoms

## 2023-06-22 DIAGNOSIS — E78.5 HYPERLIPIDEMIA, UNSPECIFIED HYPERLIPIDEMIA TYPE: ICD-10-CM

## 2023-06-22 DIAGNOSIS — I10 ESSENTIAL HYPERTENSION: ICD-10-CM

## 2023-06-22 RX ORDER — LOVASTATIN 20 MG/1
20 TABLET ORAL DAILY
Qty: 90 TABLET | Refills: 1 | Status: SHIPPED | OUTPATIENT
Start: 2023-06-22

## 2023-06-22 RX ORDER — METOPROLOL SUCCINATE 25 MG/1
25 TABLET, EXTENDED RELEASE ORAL DAILY
Qty: 90 TABLET | Refills: 1 | Status: SHIPPED | OUTPATIENT
Start: 2023-06-22

## 2023-08-21 ENCOUNTER — TELEPHONE (OUTPATIENT)
Dept: ADMINISTRATIVE | Facility: OTHER | Age: 73
End: 2023-08-21

## 2023-08-21 NOTE — TELEPHONE ENCOUNTER
Upon review of the In Basket request we were able to locate, review, and update the patient chart as requested for Mammogram.    Any additional questions or concerns should be emailed to the Practice Liaisons via the appropriate education email address, please do not reply via In Basket.     Thank you  Aamir Bolaños MA

## 2023-08-21 NOTE — TELEPHONE ENCOUNTER
----- Message from [facility] sent at 8/18/2023  2:51 PM EDT -----  08/18/23 2:52 PM    Hello, our patient Humaira Calhoun has had Mammogram completed/performed. Please assist in updating the patient chart by pulling the Care Everywhere (CE) document. The date of service is 2023.      Thank you,  [address] [place] [address]

## 2023-09-19 DIAGNOSIS — E78.5 HYPERLIPIDEMIA, UNSPECIFIED HYPERLIPIDEMIA TYPE: ICD-10-CM

## 2023-09-19 DIAGNOSIS — I10 ESSENTIAL HYPERTENSION: ICD-10-CM

## 2023-09-19 RX ORDER — METOPROLOL SUCCINATE 25 MG/1
25 TABLET, EXTENDED RELEASE ORAL DAILY
Qty: 90 TABLET | Refills: 0 | OUTPATIENT
Start: 2023-09-19

## 2023-09-19 RX ORDER — LOVASTATIN 20 MG/1
20 TABLET ORAL DAILY
Qty: 90 TABLET | Refills: 0 | OUTPATIENT
Start: 2023-09-19

## 2023-09-19 NOTE — TELEPHONE ENCOUNTER
Last office visit 5/11  Next Office Visit not scheduled. Tried to call patient could not get through or leave a message. She would like prescription refills for lovastatin and metoprolol sent to [facility] [address]

## 2023-09-21 RX ORDER — METOPROLOL SUCCINATE 25 MG/1
25 TABLET, EXTENDED RELEASE ORAL DAILY
Qty: 90 TABLET | Refills: 0 | Status: SHIPPED | OUTPATIENT
Start: 2023-09-21

## 2023-09-21 RX ORDER — LOVASTATIN 20 MG/1
20 TABLET ORAL DAILY
Qty: 90 TABLET | Refills: 0 | Status: SHIPPED | OUTPATIENT
Start: 2023-09-21

## 2023-09-27 ENCOUNTER — APPOINTMENT (OUTPATIENT)
Dept: LAB | Facility: CLINIC | Age: 73
End: 2023-09-27
Payer: MEDICARE

## 2023-09-27 DIAGNOSIS — M85.89 OSTEOPENIA OF MULTIPLE SITES: ICD-10-CM

## 2023-09-27 DIAGNOSIS — E55.9 VITAMIN D DEFICIENCY: ICD-10-CM

## 2023-09-27 LAB
25(OH)D3 SERPL-MCNC: 35.5 NG/ML (ref 30–100)
ANION GAP SERPL CALCULATED.3IONS-SCNC: 4 MMOL/L
BUN SERPL-MCNC: 22 MG/DL (ref 5–25)
CALCIUM SERPL-MCNC: 9.1 MG/DL (ref 8.4–10.2)
CHLORIDE SERPL-SCNC: 107 MMOL/L (ref 96–108)
CO2 SERPL-SCNC: 29 MMOL/L (ref 21–32)
CREAT SERPL-MCNC: 0.8 MG/DL (ref 0.6–1.3)
GFR SERPL CREATININE-BSD FRML MDRD: 73 ML/MIN/1.73SQ M
GLUCOSE SERPL-MCNC: 100 MG/DL (ref 65–140)
POTASSIUM SERPL-SCNC: 4.6 MMOL/L (ref 3.5–5.3)
SODIUM SERPL-SCNC: 140 MMOL/L (ref 135–147)

## 2023-09-27 PROCEDURE — 80048 BASIC METABOLIC PNL TOTAL CA: CPT

## 2023-09-27 PROCEDURE — 82306 VITAMIN D 25 HYDROXY: CPT

## 2023-09-27 PROCEDURE — 36415 COLL VENOUS BLD VENIPUNCTURE: CPT

## 2023-11-27 ENCOUNTER — RA CDI HCC (OUTPATIENT)
Dept: OTHER | Facility: HOSPITAL | Age: 73
End: 2023-11-27

## 2023-12-01 ENCOUNTER — OFFICE VISIT (OUTPATIENT)
Age: 73
End: 2023-12-01
Payer: MEDICARE

## 2023-12-01 VITALS
HEIGHT: 69 IN | DIASTOLIC BLOOD PRESSURE: 80 MMHG | TEMPERATURE: 98.1 F | SYSTOLIC BLOOD PRESSURE: 138 MMHG | WEIGHT: 180 LBS | HEART RATE: 74 BPM | OXYGEN SATURATION: 98 % | BODY MASS INDEX: 26.66 KG/M2

## 2023-12-01 DIAGNOSIS — E78.5 HYPERLIPIDEMIA, UNSPECIFIED HYPERLIPIDEMIA TYPE: ICD-10-CM

## 2023-12-01 DIAGNOSIS — H93.8X3 SENSATION OF FULLNESS IN BOTH EARS: ICD-10-CM

## 2023-12-01 DIAGNOSIS — Z23 ENCOUNTER FOR IMMUNIZATION: ICD-10-CM

## 2023-12-01 DIAGNOSIS — E78.2 MIXED HYPERLIPIDEMIA: ICD-10-CM

## 2023-12-01 DIAGNOSIS — I10 ESSENTIAL HYPERTENSION: Primary | ICD-10-CM

## 2023-12-01 PROBLEM — G25.0 ESSENTIAL TREMOR: Status: ACTIVE | Noted: 2023-12-01

## 2023-12-01 PROCEDURE — 99214 OFFICE O/P EST MOD 30 MIN: CPT | Performed by: NURSE PRACTITIONER

## 2023-12-01 PROCEDURE — 90662 IIV NO PRSV INCREASED AG IM: CPT

## 2023-12-01 PROCEDURE — G0439 PPPS, SUBSEQ VISIT: HCPCS | Performed by: NURSE PRACTITIONER

## 2023-12-01 PROCEDURE — G0008 ADMIN INFLUENZA VIRUS VAC: HCPCS

## 2023-12-01 RX ORDER — METOPROLOL SUCCINATE 25 MG/1
25 TABLET, EXTENDED RELEASE ORAL DAILY
Qty: 90 TABLET | Refills: 1 | Status: SHIPPED | OUTPATIENT
Start: 2023-12-01

## 2023-12-01 RX ORDER — FLUTICASONE PROPIONATE 50 MCG
2 SPRAY, SUSPENSION (ML) NASAL DAILY
COMMUNITY
Start: 2023-11-10 | End: 2023-12-10

## 2023-12-01 RX ORDER — BENZONATATE 200 MG/1
CAPSULE ORAL
COMMUNITY
Start: 2023-11-10 | End: 2023-12-01

## 2023-12-01 RX ORDER — LOVASTATIN 20 MG/1
20 TABLET ORAL DAILY
Qty: 90 TABLET | Refills: 1 | Status: SHIPPED | OUTPATIENT
Start: 2023-12-01

## 2023-12-01 RX ORDER — FLUOROURACIL 50 MG/G
CREAM TOPICAL
COMMUNITY
Start: 2023-10-25

## 2023-12-01 RX ORDER — CYCLOBENZAPRINE HCL 10 MG
TABLET ORAL
COMMUNITY
Start: 2023-09-19 | End: 2023-12-01

## 2023-12-01 NOTE — ASSESSMENT & PLAN NOTE
Due for updated lipid panel. Continue lovastatin. Recommend healthy lifestyle choices for your cholesterol. Low fat/low cholesterol diet. Limit/avoid red meat. Eat more lean meat - chicken breast, ground turkey, fish. Exercise 30 mins at least 5 times a week as tolerated.

## 2023-12-01 NOTE — ASSESSMENT & PLAN NOTE
Well controlled on metoprolol. Continue current regimen -   Continue to monitor blood pressure at home. Goal BP is < 130/80. Contact our office for consistent elevations. Recommend low sodium diet. Exercise 30 minutes 5 times a week as tolerated.   Recommend yearly eye exam.

## 2023-12-01 NOTE — PROGRESS NOTES
Assessment and Plan:     Problem List Items Addressed This Visit          Cardiovascular and Mediastinum    Essential hypertension - Primary     Well controlled on metoprolol. Continue current regimen -   Continue to monitor blood pressure at home. Goal BP is < 130/80. Contact our office for consistent elevations. Recommend low sodium diet. Exercise 30 minutes 5 times a week as tolerated. Recommend yearly eye exam.            Relevant Medications    metoprolol succinate (TOPROL-XL) 25 mg 24 hr tablet    Other Relevant Orders    CBC and differential    Comprehensive metabolic panel    Lipid panel       Nervous and Auditory    Sensation of fullness in both ears     -likely secondary to post nasal drip   -start flonase and zyrtec  -defer chest xray at this time            Other    Mixed hyperlipidemia     Due for updated lipid panel. Continue lovastatin. Recommend healthy lifestyle choices for your cholesterol. Low fat/low cholesterol diet. Limit/avoid red meat. Eat more lean meat - chicken breast, ground turkey, fish. Exercise 30 mins at least 5 times a week as tolerated. Relevant Medications    lovastatin (MEVACOR) 20 mg tablet     Other Visit Diagnoses       Hyperlipidemia, unspecified hyperlipidemia type        Relevant Medications    lovastatin (MEVACOR) 20 mg tablet    Encounter for immunization        Relevant Orders    influenza vaccine, high-dose, PF 0.7 mL (FLUZONE HIGH-DOSE) (Completed)          BMI Counseling: Body mass index is 26.3 kg/m². The BMI is above normal. Nutrition recommendations include decreasing portion sizes, encouraging healthy choices of fruits and vegetables, decreasing fast food intake, consuming healthier snacks, limiting drinks that contain sugar, moderation in carbohydrate intake, increasing intake of lean protein, reducing intake of saturated and trans fat and reducing intake of cholesterol. Exercise recommendations include exercising 3-5 times per week. Rationale for BMI follow-up plan is due to patient being overweight or obese. Depression Screening and Follow-up Plan: Patient was screened for depression during today's encounter. They screened negative with a PHQ-2 score of 0. Preventive health issues were discussed with patient, and age appropriate screening tests were ordered as noted in patient's After Visit Summary. Personalized health advice and appropriate referrals for health education or preventive services given if needed, as noted in patient's After Visit Summary. History of Present Illness:     Patient presents for a Medicare Wellness Visit    Jacinto Rich is here today for follow up. Overall she is doing well with no new concerns. Has had a dry cough for about a month, and a tickle in her throat, was treated with amoxicillin     HLD- take lovastatin with no side effects, , due for lipid panel. HTN- controlled on metoprolol, denies CP, headaches and changes of vision, denies side effects with medciation     Osteopenia- follows rheumatology       Patient Care Team:  LINDA Covarrubias as PCP - General (Family Medicine)  Anuja Enriquez MD     Review of Systems:     Review of Systems   Constitutional:  Negative for activity change, appetite change, chills, diaphoresis and fever. HENT:  Negative for congestion, ear discharge, ear pain, postnasal drip, rhinorrhea, sinus pressure, sinus pain and sore throat. Eyes:  Negative for pain, discharge, itching and visual disturbance. Respiratory:  Positive for cough. Negative for chest tightness, shortness of breath and wheezing. Cardiovascular:  Negative for chest pain, palpitations and leg swelling. Gastrointestinal:  Negative for abdominal pain, constipation, diarrhea, nausea and vomiting. Endocrine: Negative for polydipsia, polyphagia and polyuria. Genitourinary:  Negative for difficulty urinating, dysuria and urgency.    Musculoskeletal:  Negative for arthralgias, back pain and neck pain. Skin:  Negative for rash and wound. Neurological:  Negative for dizziness, weakness, numbness and headaches.         Problem List:     Patient Active Problem List   Diagnosis    Mixed hyperlipidemia    Essential hypertension    Palpitations    Anxiety    Atypical chest pain    Osteopenia    Primary generalized (osteo)arthritis    Syncope    Essential tremor    Sensation of fullness in both ears      Past Medical and Surgical History:     Past Medical History:   Diagnosis Date    Anxiety disorder     Basal cell carcinoma     leg and nose    Basal cell carcinoma (BCC) of back     Diverticulosis large intestine w/o perforation or abscess w/o bleeding     Hyperlipidemia     Hypertension     Microscopic hematuria     Osteoarthritis     Osteopenia of multiple sites     Retinal tear of left eye      Past Surgical History:   Procedure Laterality Date    COLONOSCOPY  2018    HYSTERECTOMY      age 72    MAMMO (HISTORICAL)  2019    OOPHORECTOMY Bilateral     age 72    TONSILLECTOMY        Family History:     Family History   Problem Relation Age of Onset    Multiple sclerosis Mother     Depression Mother     Completed Suicide  Mother     Heart disease Father     Diabetes Father     Thyroid disease unspecified Sister     Breast cancer Paternal Grandmother 76    Hypertension Other     Arthritis Other         DDD sister      Social History:     Social History     Socioeconomic History    Marital status: /Civil Union     Spouse name: None    Number of children: None    Years of education: None    Highest education level: None   Occupational History    None   Tobacco Use    Smoking status: Former     Packs/day: 0.25     Years: 20.00     Total pack years: 5.00     Types: Cigarettes     Start date:      Quit date:      Years since quittin.9    Smokeless tobacco: Never    Tobacco comments:     quit 20 years ago   Vaping Use    Vaping Use: Never used   Substance and Sexual Activity    Alcohol use: Yes     Comment: social wine drinker    Drug use: Never    Sexual activity: None   Other Topics Concern    None   Social History Narrative    None     Social Determinants of Health     Financial Resource Strain: Low Risk  (12/1/2023)    Overall Financial Resource Strain (CARDIA)     Difficulty of Paying Living Expenses: Not hard at all   Food Insecurity: Not on file   Transportation Needs: No Transportation Needs (12/1/2023)    PRAPARE - Transportation     Lack of Transportation (Medical): No     Lack of Transportation (Non-Medical): No   Physical Activity: Not on file   Stress: Not on file   Social Connections: Not on file   Intimate Partner Violence: Not on file   Housing Stability: Not on file      Medications and Allergies:     Current Outpatient Medications   Medication Sig Dispense Refill    Calcium Carb-Cholecalciferol (CALCIUM 1000 + D PO) Take 2,000 mg by mouth      fluorouracil (EFUDEX) 5 % cream Apply TWICE A DAY TO actinic keratoses ON upper LIP FOR 2 WEEKS. fluticasone (FLONASE) 50 mcg/act nasal spray 2 sprays into each nostril daily      lovastatin (MEVACOR) 20 mg tablet Take 1 tablet (20 mg total) by mouth daily 90 tablet 1    metoprolol succinate (TOPROL-XL) 25 mg 24 hr tablet Take 1 tablet (25 mg total) by mouth daily 90 tablet 1     No current facility-administered medications for this visit.      No Known Allergies   Immunizations:     Immunization History   Administered Date(s) Administered    COVID-19 MODERNA VACC 0.5 ML IM 01/05/2021, 02/04/2021, 11/29/2021, 10/03/2022    INFLUENZA 10/29/2020, 10/29/2020, 10/31/2022    Influenza Split High Dose Preservative Free IM 10/28/2020    Influenza, high dose seasonal 0.7 mL 10/31/2022, 12/01/2023    Pneumococcal Conjugate 13-Valent 10/21/2016, 10/21/2016, 11/19/2021      Health Maintenance:         Topic Date Due    Hepatitis C Screening  Never done    Breast Cancer Screening: Mammogram  06/12/2024    Colorectal Cancer Screening  05/01/2030 Topic Date Due    Pneumococcal Vaccine: 65+ Years (2 - PPSV23 if available, else PCV20) 11/19/2022    COVID-19 Vaccine (5 - Moderna series) 11/28/2022      Medicare Screening Tests and Risk Assessments:     Guillermina Lindquist is here for her Subsequent Wellness visit. Last Medicare Wellness visit information reviewed, patient interviewed and updates made to the record today. Health Risk Assessment:   Patient rates overall health as good. Patient feels that their physical health rating is same. Patient is satisfied with their life. Eyesight was rated as slightly worse. Hearing was rated as slightly worse. Patient feels that their emotional and mental health rating is slightly better. Patients states they are never, rarely angry. Patient states they are sometimes unusually tired/fatigued. Pain experienced in the last 7 days has been some. Patient's pain rating has been 2/10. Patient states that she has experienced no weight loss or gain in last 6 months. Depression Screening:   PHQ-2 Score: 0      Fall Risk Screening: In the past year, patient has experienced: no history of falling in past year      Urinary Incontinence Screening:   Patient has leaked urine accidently in the last six months. Home Safety:  Patient does not have trouble with stairs inside or outside of their home. Patient has working smoke alarms and has working carbon monoxide detector. Home safety hazards include: none. Nutrition:   Current diet is Regular, Low Cholesterol and Limited junk food. Medications:   Patient is not currently taking any over-the-counter supplements. Patient is able to manage medications. Activities of Daily Living (ADLs)/Instrumental Activities of Daily Living (IADLs):   Walk and transfer into and out of bed and chair?: Yes  Dress and groom yourself?: Yes    Bathe or shower yourself?: Yes    Feed yourself?  Yes  Do your laundry/housekeeping?: Yes  Manage your money, pay your bills and track your expenses?: Yes  Make your own meals?: Yes    Do your own shopping?: Yes    Previous Hospitalizations:   Any hospitalizations or ED visits within the last 12 months?: No      Advance Care Planning:   Living will: Yes    Durable POA for healthcare: Yes    Advanced directive: Yes    Advanced directive counseling given: Yes    ACP document given: Yes      PREVENTIVE SCREENINGS      Cardiovascular Screening:    General: Screening Not Indicated, History Lipid Disorder and Screening Current      Diabetes Screening:     General: Screening Current      Colorectal Cancer Screening:     General: Screening Current      Breast Cancer Screening:     General: Screening Current      Cervical Cancer Screening:    General: Screening Not Indicated      Osteoporosis Screening:    General: Screening Current      Abdominal Aortic Aneurysm (AAA) Screening:        General: Screening Not Indicated      Lung Cancer Screening:     General: Screening Not Indicated      Hepatitis C Screening:    General: Screening Not Indicated    Screening, Brief Intervention, and Referral to Treatment (SBIRT)    Screening  Typical number of drinks in a day: 1  Typical number of drinks in a week: 6  Interpretation: Low risk drinking behavior. AUDIT-C Screenin) How often did you have a drink containing alcohol in the past year? 2 to 3 times a week  2) How many drinks did you have on a typical day when you were drinking in the past year? 1 to 2  3) How often did you have 6 or more drinks on one occasion in the past year? never    AUDIT-C Score: 3  Interpretation: Score 3-12 (female): POSITIVE screen for alcohol misuse    AUDIT Screenin) How often during the last year have you found that you were not able to stop drinking once you had started?  0 - never  5) How often during the last year have you failed to do what was normally expected from you because of drinking? 0 - never  6) How often during the last year have you needed a first drink in the morning to get yourself going after a heavy drinking session? 0 - never  7) How often during the last year have you had a feeling of guilt or remorse after drinking? 0 - never  8) How often during the last year have you been unable to remember what happened the night before because you had been drinking? 0 - never  9) Have you or someone else been injured as a result of your drinking? 0 - no  10) Has a relative or friend or a doctor or another health worker been concerned about your drinking or suggested you cut down? 0 - no    AUDIT Score: 3  Interpretation: Low risk alcohol consumption    Single Item Drug Screening:  How often have you used an illegal drug (including marijuana) or a prescription medication for non-medical reasons in the past year? never    Single Item Drug Screen Score: 0  Interpretation: Negative screen for possible drug use disorder    Other Counseling Topics:   Car/seat belt/driving safety, skin self-exam, sunscreen and calcium and vitamin D intake and regular weightbearing exercise. No results found. Physical Exam:     /80   Pulse 74   Temp 98.1 °F (36.7 °C) (Temporal)   Ht 5' 9.37" (1.762 m)   Wt 81.6 kg (180 lb)   SpO2 98%   BMI 26.30 kg/m²     Physical Exam  Vitals and nursing note reviewed. Constitutional:       General: She is not in acute distress. Appearance: She is well-developed. HENT:      Head: Normocephalic and atraumatic. Right Ear: Tympanic membrane is bulging. Left Ear: Tympanic membrane is bulging. Eyes:      Conjunctiva/sclera: Conjunctivae normal.   Cardiovascular:      Rate and Rhythm: Normal rate and regular rhythm. Heart sounds: No murmur heard. Pulmonary:      Effort: Pulmonary effort is normal. No respiratory distress. Breath sounds: Normal breath sounds. Abdominal:      Palpations: Abdomen is soft. Tenderness: There is no abdominal tenderness. Musculoskeletal:         General: No swelling.       Cervical back: Neck supple. Skin:     General: Skin is warm and dry. Capillary Refill: Capillary refill takes less than 2 seconds. Neurological:      Mental Status: She is alert.    Psychiatric:         Mood and Affect: Mood normal.          LINDA Lundberg

## 2024-01-25 ENCOUNTER — TELEPHONE (OUTPATIENT)
Age: 74
End: 2024-01-25

## 2024-02-05 ENCOUNTER — OFFICE VISIT (OUTPATIENT)
Age: 74
End: 2024-02-05
Payer: MEDICARE

## 2024-02-05 VITALS
HEIGHT: 69 IN | DIASTOLIC BLOOD PRESSURE: 70 MMHG | TEMPERATURE: 98.2 F | BODY MASS INDEX: 26.98 KG/M2 | OXYGEN SATURATION: 95 % | WEIGHT: 182.2 LBS | SYSTOLIC BLOOD PRESSURE: 130 MMHG | HEART RATE: 83 BPM

## 2024-02-05 DIAGNOSIS — E55.9 VITAMIN D INSUFFICIENCY: ICD-10-CM

## 2024-02-05 DIAGNOSIS — E78.2 MIXED HYPERLIPIDEMIA: ICD-10-CM

## 2024-02-05 DIAGNOSIS — I10 ESSENTIAL HYPERTENSION: ICD-10-CM

## 2024-02-05 DIAGNOSIS — M85.89 OSTEOPENIA OF MULTIPLE SITES: Primary | ICD-10-CM

## 2024-02-05 DIAGNOSIS — M15.0 PRIMARY GENERALIZED (OSTEO)ARTHRITIS: ICD-10-CM

## 2024-02-05 DIAGNOSIS — Z79.899 ENCOUNTER FOR LONG-TERM (CURRENT) USE OF OTHER MEDICATIONS: ICD-10-CM

## 2024-02-05 PROCEDURE — 99213 OFFICE O/P EST LOW 20 MIN: CPT | Performed by: INTERNAL MEDICINE

## 2024-02-05 NOTE — ASSESSMENT & PLAN NOTE
Osteopenia with metabolic work-up unremarkable.  Family history of osteoporosis in her sister.  Patient has no history of adult fracture.  Results of DEXA dated 10/20/2023 with osteopenia.  It was done at a different facility which makes it more difficult to try and compare, however, it does appear to be stable with some suggestion of improvement in the total hip.  No need for prescription medication at this time.  Continue with calcium and vitamin D supplement and home exercise program as tolerated. Follow up 1 year or sooner if needed.

## 2024-02-05 NOTE — ASSESSMENT & PLAN NOTE
Continue medication as ordered.  Follow-up primary care who will continue to monitor lipid profile.  Continue to monitor.

## 2024-02-05 NOTE — ASSESSMENT & PLAN NOTE
Continue medication as ordered.  Continue to monitor blood pressure.  Monitor labs for medication toxicity.  Follow-up primary care.

## 2024-02-05 NOTE — PROGRESS NOTES
Assessment/Plan:    Osteopenia  Osteopenia with metabolic work-up unremarkable.  Family history of osteoporosis in her sister.  Patient has no history of adult fracture.  Results of DEXA dated 10/20/2023 with osteopenia.  It was done at a different facility which makes it more difficult to try and compare, however, it does appear to be stable with some suggestion of improvement in the total hip.  No need for prescription medication at this time.  Continue with calcium and vitamin D supplement and home exercise program as tolerated. Follow up 1 year or sooner if needed.    Primary generalized (osteo)arthritis  Primary generalized osteoarthritis with interphalangeal osteoarthritis hands and feet.  Probable DJD bilateral knees right greater than left.  Questionable history internal derangement right knee with no current sx's.  Encouraged home exercise program.  If patient has significant symptoms, would consider physical therapy.  Continue to monitor.    Mixed hyperlipidemia  Continue medication as ordered.  Follow-up primary care who will continue to monitor lipid profile.  Continue to monitor.    Essential hypertension  Continue medication as ordered.  Continue to monitor blood pressure.  Monitor labs for medication toxicity.  Follow-up primary care.         Problem List Items Addressed This Visit     Mixed hyperlipidemia     Continue medication as ordered.  Follow-up primary care who will continue to monitor lipid profile.  Continue to monitor.         Essential hypertension     Continue medication as ordered.  Continue to monitor blood pressure.  Monitor labs for medication toxicity.  Follow-up primary care.         Osteopenia - Primary     Osteopenia with metabolic work-up unremarkable.  Family history of osteoporosis in her sister.  Patient has no history of adult fracture.  Results of DEXA dated 10/20/2023 with osteopenia.  It was done at a different facility which makes it more difficult to try and compare,  however, it does appear to be stable with some suggestion of improvement in the total hip.  No need for prescription medication at this time.  Continue with calcium and vitamin D supplement and home exercise program as tolerated. Follow up 1 year or sooner if needed.         Relevant Orders    Basic metabolic panel    Primary generalized (osteo)arthritis     Primary generalized osteoarthritis with interphalangeal osteoarthritis hands and feet.  Probable DJD bilateral knees right greater than left.  Questionable history internal derangement right knee with no current sx's.  Encouraged home exercise program.  If patient has significant symptoms, would consider physical therapy.  Continue to monitor.        Other Visit Diagnoses     Encounter for long-term (current) use of other medications        Vitamin D insufficiency        Relevant Orders    Vitamin D 25 hydroxy                Reviewed records, labs, and imaging with the patient in detail.  Counseled patient.  Discussion regarding my findings and recommendations.  Office visit with documentation 25 min.    Subjective:      Patient ID: Shira Galarza is a 74 y.o. female.    Patient with osteopenia on DEXA dated 09/23/2021 significant for spine T-score-1.2 stable from the prior study.  Left total hip-1.8 with a decrease of 6.5% as compared to prior study.  Femoral neck-2.3 with a decrease of 1.1% as compared to prior study.  FRAX risk for hip fracture 3.2%.  FRAX risk for major fracture 14%.  Updated DEXA dated 10/20/2023 was done at a different facility making capacity more difficult.  L1, L2, and L3 T-score -0.9 overall stable.  Total hip -1.2 with a calculated increase of 8%, however difficult to compare as it was done at a different facility.  Femoral neck -2.3.  FRAX risk for hip fracture 3.8% with major fracture risk 5.5%.  Patient has been menopausal since age 52 naturally.  She did use Progest cream in early post menopause.  She has no history of  height loss or adult fracture.  Positive family history of osteoporosis in her sister with questionable vertebral fracture.  She has no personal history of thyroid disease or kidney stones.  She has been consistent with calcium and vitamin-D supplement and home exercise program.  She has no significant morning stiffness.  Left hip trochanteric bursitis stable after Depo steroid injection in the past.  She was diagnosed with iliotibial band syndrome last year improved with injection and physical therapy.  She had been seen in the past at [facility] for right knee pain and was diagnosed with a slight meniscal tear.  This has not bothered her more recently.  She is has some complaints of right shoulder pain particularly in the area of the rotator cuff.  She believes she may have pulled something but it is improving with ice and time.  This occurred in the past week.  She is to see ENT tomorrow for slight hearing loss left greater than right and tinnitus.  Review of systems is remarkable for palpitations with workup including echocardiogram and EKG unremarkable according to the patient.  She has loose BMs depending on what food she eats with history of diverticulosis.  She has frequent urination.  She states she has difficulty sleeping.  She gives a history of anxiety currently stable/less bothersome.    Lab work dated 9/27/2023 significant for vitamin D 35.5.  Calcium 9.1 with estimated GFR 73.  Creatinine 0.80.  Review of lab work dated 9/14/2022 significant for PTH 53.3.  CBC unremarkable.  Celiac panel negative.  Calcium 9.3.  Estimated GFR 86.  CRP less than 3.0.  IgG 705.  IgM 58.  Serum protein electrophoresis no monoclonal bands.  Urine dipstick positive for leukocytes and nitrite with microscopic significant for bacteria with no urinary symptoms.  24-hour urine calcium normal at 137.        Allergies  No Known Allergies    Home Medications    Current Outpatient Medications:   •  Calcium  Carb-Cholecalciferol (CALCIUM 1000 + D PO), Take 2,000 mg by mouth, Disp: , Rfl:   •  fluorouracil (EFUDEX) 5 % cream, Apply TWICE A DAY TO actinic keratoses ON upper LIP FOR 2 WEEKS., Disp: , Rfl:   •  lovastatin (MEVACOR) 20 mg tablet, Take 1 tablet (20 mg total) by mouth daily, Disp: 90 tablet, Rfl: 1  •  metoprolol succinate (TOPROL-XL) 25 mg 24 hr tablet, Take 1 tablet (25 mg total) by mouth daily, Disp: 90 tablet, Rfl: 1  •  fluticasone (FLONASE) 50 mcg/act nasal spray, 2 sprays into each nostril daily, Disp: , Rfl:     Past Medical History  Past Medical History:   Diagnosis Date   • Anxiety disorder    • Basal cell carcinoma     leg and nose   • Basal cell carcinoma (BCC) of back    • Diverticulosis large intestine w/o perforation or abscess w/o bleeding    • HL (hearing loss)    • Hyperlipidemia    • Hypertension    • Microscopic hematuria    • Osteoarthritis    • Osteopenia of multiple sites    • Retinal tear of left eye    • Tinnitus        Past Surgical History   Past Surgical History:   Procedure Laterality Date   • COLONOSCOPY  04/2018   • HYSTERECTOMY      age 65   • MAMMO (HISTORICAL)  2019   • OOPHORECTOMY Bilateral     age 65   • TONSILLECTOMY         Family History   Family History   Problem Relation Age of Onset   • Multiple sclerosis Mother    • Depression Mother    • Completed Suicide  Mother    • Heart disease Father    • Diabetes Father    • Thyroid disease unspecified Sister    • Breast cancer Paternal Grandmother 75   • Hypertension Other    • Arthritis Other         DDD sister       The following portions of the patient's history were reviewed and updated as appropriate: allergies, current medications, past family history, past medical history, past social history, past surgical history, and problem list.    Review of Systems   Constitutional:  Negative for chills and fever.   HENT:  Positive for hearing loss and tinnitus. Negative for ear pain and sore throat.    Eyes:  Negative for pain  "and visual disturbance.        Floaters  Hx retinal tear treated with laser, follows with Dr. Horowitz   Respiratory:  Negative for cough and shortness of breath.    Cardiovascular:  Positive for palpitations. Negative for chest pain.   Gastrointestinal:  Negative for abdominal pain and vomiting.        Diverticulosis with hx occas loose BM's depending on food   Genitourinary:  Positive for frequency. Negative for dysuria and hematuria.   Musculoskeletal:  Positive for arthralgias. Negative for back pain.   Skin:  Negative for color change and rash.   Neurological:  Negative for seizures and syncope.   Psychiatric/Behavioral:          Anxiety   All other systems reviewed and are negative.        Objective:      /70 (BP Location: Left arm, Patient Position: Sitting, Cuff Size: Adult)   Pulse 83   Temp 98.2 °F (36.8 °C) (Tympanic)   Ht 5' 9\" (1.753 m)   Wt 82.6 kg (182 lb 3.2 oz)   SpO2 95%   BMI 26.91 kg/m²          Physical Exam  Vitals reviewed.   Constitutional:       Appearance: Normal appearance.   HENT:      Head: Normocephalic.      Nose:      Comments: Nose and throat unremarkable  Eyes:      Extraocular Movements: Extraocular movements intact.   Neck:      Comments: Without masses, thyromegaly, lymphadenopathy  Cardiovascular:      Rate and Rhythm: Regular rhythm.   Pulmonary:      Breath sounds: Normal breath sounds.   Abdominal:      Palpations: Abdomen is soft.   Musculoskeletal:      Cervical back: Neck supple.      Comments: Neck decreased lateral flexion.  Shoulders full range of motion as do elbows and wrists.  Drop arm test negative.  No rotator cuff weakness appreciated.  Tinel's negative bilaterally.  Hands squaring 1st carpometacarpal joints bilaterally with very early scattered Heberden's nodes.  No synovitis noted.  Straight leg raising negative bilaterally.  Schober's negative.  No SI joint tenderness appreciated.  Hips full range of motion.  Knees small effusions bilaterally with " prominent patellofemoral crepitus.  Full range of motion bilateral knees. Ankles full range of motion.  Feet rearfoot hyperpronation with midfoot cavus deformities, high insteps, hallux valgus deformities, and hammertoe deformities.  No synovitis noted.   Skin:     General: Skin is warm.   Neurological:      General: No focal deficit present.               This note was written in part using the assistance of the [employer] [id number] microphone system. Those portions using this system have been dictated and not read.

## 2024-02-05 NOTE — PATIENT INSTRUCTIONS
Continue your calcium and vitamin D supplement.  Continue home exercise program.  I would alternate ice and heat for your right shoulder symptoms.  There is no evidence of any significant rotator cuff tear on exam.  If your symptoms worsen, call the office and we can send you to physical therapy.  Get lab work 2 weeks before your next office visit in 1 year.

## 2024-06-14 DIAGNOSIS — I10 ESSENTIAL HYPERTENSION: ICD-10-CM

## 2024-06-14 DIAGNOSIS — E78.5 HYPERLIPIDEMIA, UNSPECIFIED HYPERLIPIDEMIA TYPE: ICD-10-CM

## 2024-06-14 RX ORDER — METOPROLOL SUCCINATE 25 MG/1
25 TABLET, EXTENDED RELEASE ORAL DAILY
Qty: 90 TABLET | Refills: 0 | Status: SHIPPED | OUTPATIENT
Start: 2024-06-14

## 2024-06-14 RX ORDER — LOVASTATIN 20 MG/1
20 TABLET ORAL DAILY
Qty: 90 TABLET | Refills: 0 | Status: SHIPPED | OUTPATIENT
Start: 2024-06-14

## 2024-09-24 DIAGNOSIS — E78.5 HYPERLIPIDEMIA, UNSPECIFIED HYPERLIPIDEMIA TYPE: ICD-10-CM

## 2024-09-24 DIAGNOSIS — I10 ESSENTIAL HYPERTENSION: ICD-10-CM

## 2024-09-25 RX ORDER — LOVASTATIN 20 MG
20 TABLET ORAL DAILY
Qty: 30 TABLET | Refills: 0 | Status: SHIPPED | OUTPATIENT
Start: 2024-09-25 | End: 2024-09-26 | Stop reason: SDUPTHER

## 2024-09-25 RX ORDER — METOPROLOL SUCCINATE 25 MG/1
25 TABLET, EXTENDED RELEASE ORAL DAILY
Qty: 90 TABLET | Refills: 1 | Status: SHIPPED | OUTPATIENT
Start: 2024-09-25

## 2024-09-25 NOTE — TELEPHONE ENCOUNTER
Patient requires updated blood work and has previously placed orders. Please contact patient to go for labs. Courtesy refill provided.  Lipid Panel    A [email or web address] message was sent to the patient letting them know a courtesy refill has been provided and to complete lab(s) prior to next refill. Please follow up to make sure pt receives/reads message and completes blood work.

## 2024-09-26 RX ORDER — LOVASTATIN 20 MG
20 TABLET ORAL DAILY
Qty: 90 TABLET | Refills: 1 | Status: SHIPPED | OUTPATIENT
Start: 2024-09-26

## 2024-09-26 NOTE — TELEPHONE ENCOUNTER
Called patient and left a message to go for labs that were ordered at last visit on 12/1/23 prior to her next visit on 12/2/24.

## 2024-09-26 NOTE — TELEPHONE ENCOUNTER
Pt called to see what she should do regarding getting her prescriptions filled since 30 days will not last until her upcoming appt in December. I advised pt to complete the labwork as soon as she could and everything would be updated to continue the refills. Pt asked if all the active labs in her chart can be mailed to her. Please advise.

## 2024-09-26 NOTE — TELEPHONE ENCOUNTER
FYI, patient is told to get their blood work done a week before their next Appointment.  We don't tell them to get the blood work done in order to get their refills.  They need to have an up coming appointment to continue their refills.  If they don't have an appointment, then they need to make an appointment to continue the refills.  We don't tell our patients to get the blood work done in order to get refills.  Please advise with our office with any issues on the refills on our patients.  Thank you

## 2024-10-01 NOTE — TELEPHONE ENCOUNTER
Were refill sent for patient?  Please call and apologize for staff stating that patient needed blood work prior to refills.

## 2024-10-11 DIAGNOSIS — I10 ESSENTIAL HYPERTENSION: ICD-10-CM

## 2024-10-11 DIAGNOSIS — E78.5 HYPERLIPIDEMIA, UNSPECIFIED HYPERLIPIDEMIA TYPE: ICD-10-CM

## 2024-10-11 RX ORDER — METOPROLOL SUCCINATE 25 MG/1
25 TABLET, EXTENDED RELEASE ORAL DAILY
Qty: 90 TABLET | Refills: 0 | Status: CANCELLED | OUTPATIENT
Start: 2024-10-11

## 2024-10-11 RX ORDER — LOVASTATIN 20 MG/1
20 TABLET ORAL DAILY
Qty: 90 TABLET | Refills: 0 | Status: CANCELLED | OUTPATIENT
Start: 2024-10-11

## 2024-10-23 ENCOUNTER — RA CDI HCC (OUTPATIENT)
Dept: OTHER | Facility: HOSPITAL | Age: 74
End: 2024-10-23

## 2024-10-29 ENCOUNTER — OFFICE VISIT (OUTPATIENT)
Age: 74
End: 2024-10-29
Payer: MEDICARE

## 2024-10-29 VITALS
SYSTOLIC BLOOD PRESSURE: 142 MMHG | OXYGEN SATURATION: 97 % | DIASTOLIC BLOOD PRESSURE: 90 MMHG | WEIGHT: 179.6 LBS | BODY MASS INDEX: 26.6 KG/M2 | HEART RATE: 73 BPM | HEIGHT: 69 IN | TEMPERATURE: 98.4 F

## 2024-10-29 DIAGNOSIS — E61.1 IRON DEFICIENCY: ICD-10-CM

## 2024-10-29 DIAGNOSIS — K21.9 GASTROESOPHAGEAL REFLUX DISEASE WITHOUT ESOPHAGITIS: ICD-10-CM

## 2024-10-29 DIAGNOSIS — E03.9 HYPOTHYROIDISM, UNSPECIFIED TYPE: ICD-10-CM

## 2024-10-29 DIAGNOSIS — D03.71 MELANOMA IN SITU OF RIGHT LOWER EXTREMITY INCLUDING HIP (HCC): ICD-10-CM

## 2024-10-29 DIAGNOSIS — Z23 ENCOUNTER FOR IMMUNIZATION: Primary | ICD-10-CM

## 2024-10-29 DIAGNOSIS — R00.2 PALPITATIONS: ICD-10-CM

## 2024-10-29 DIAGNOSIS — R00.1 BRADYCARDIA: ICD-10-CM

## 2024-10-29 DIAGNOSIS — I10 ESSENTIAL HYPERTENSION: ICD-10-CM

## 2024-10-29 DIAGNOSIS — R79.89 ABNORMAL TSH: ICD-10-CM

## 2024-10-29 PROCEDURE — 99214 OFFICE O/P EST MOD 30 MIN: CPT | Performed by: NURSE PRACTITIONER

## 2024-10-29 PROCEDURE — 93000 ELECTROCARDIOGRAM COMPLETE: CPT | Performed by: NURSE PRACTITIONER

## 2024-10-29 PROCEDURE — 90662 IIV NO PRSV INCREASED AG IM: CPT

## 2024-10-29 PROCEDURE — G0008 ADMIN INFLUENZA VIRUS VAC: HCPCS

## 2024-10-29 RX ORDER — LISINOPRIL 5 MG/1
5 TABLET ORAL DAILY
Qty: 90 TABLET | Refills: 1 | Status: SHIPPED | OUTPATIENT
Start: 2024-10-29

## 2024-10-29 RX ORDER — METOPROLOL SUCCINATE 25 MG/1
12.5 TABLET, EXTENDED RELEASE ORAL DAILY
Qty: 90 TABLET | Refills: 1 | Status: SHIPPED | OUTPATIENT
Start: 2024-10-29

## 2024-10-29 NOTE — ASSESSMENT & PLAN NOTE
You may use OTC tums as needed.  Recommend small frequent meals throughout the day.  Avoid aggravating foods - spicy foods, acidic foods - such as tomato and citrus.  Avoid alcohol.  Do not lay down for at least 30 mins after eating.              
-Continue metoprolol but reduce to 12.5 mg  -Add low-dose lisinopril 5 mg tablet daily  -Follow-up in 1 month for repeat blood pressure check    Orders:    metoprolol succinate (TOPROL-XL) 25 mg 24 hr tablet; Take 0.5 tablets (12.5 mg total) by mouth daily    lisinopril (ZESTRIL) 5 mg tablet; Take 1 tablet (5 mg total) by mouth daily    
-had seen cardiology in the past for similar concern   -will get holtor monitor   -EKG in office today reviewed with Dr. Magana, sinus ayesha with nonspecific T wave changes   -will get blood work     Orders:    POCT ECG    Holter monitor; Future    Iron Panel (Includes Ferritin, Iron Sat%, Iron, and TIBC); Future    
-reduce metoprolol to 12.5 mg tablet daily         
8

## 2024-10-29 NOTE — PROGRESS NOTES
Ambulatory Visit  Name: Shira Castellon      : 1950      MRN: [id number]  Encounter Provider: LINDA Goodson  Encounter Date: 10/29/2024   Encounter department: [facility] [place]    Assessment & Plan  Encounter for immunization    Orders:    influenza vaccine, high-dose, PF 0.5 mL (Fluzone High Dose)    Palpitations  -had seen cardiology in the past for similar concern   -will get holtor monitor   -EKG in office today reviewed with Dr. Magana, sinus ayesha with nonspecific T wave changes   -will get blood work     Orders:    POCT ECG    Holter monitor; Future    Iron Panel (Includes Ferritin, Iron Sat%, Iron, and TIBC); Future    Abnormal TSH    Orders:    TSH, 3rd generation with Free T4 reflex    Hypothyroidism, unspecified type    Orders:    TSH, 3rd generation with Free T4 reflex    Melanoma in situ of right lower extremity including hip (HCC)         Iron deficiency    Orders:    Iron Panel (Includes Ferritin, Iron Sat%, Iron, and TIBC); Future    Gastroesophageal reflux disease without esophagitis   You may use OTC tums as needed.  Recommend small frequent meals throughout the day.  Avoid aggravating foods - spicy foods, acidic foods - such as tomato and citrus.  Avoid alcohol.  Do not lay down for at least 30 mins after eating.              Essential hypertension  -Continue metoprolol but reduce to 12.5 mg  -Add low-dose lisinopril 5 mg tablet daily  -Follow-up in 1 month for repeat blood pressure check    Orders:    metoprolol succinate (TOPROL-XL) 25 mg 24 hr tablet; Take 0.5 tablets (12.5 mg total) by mouth daily    lisinopril (ZESTRIL) 5 mg tablet; Take 1 tablet (5 mg total) by mouth daily    Bradycardia  -reduce metoprolol to 12.5 mg tablet daily            History of Present Illness     Patient presents today with concerns for palpitations and GERD.  She reports that symptoms have resolved at this time.     She reports about 6 weeks ago  "she had palpitations.   She reports that she had about 2 days of palpation  She was having trouble sleeping and she took a THC gummy to help her sleep  She did not feel anxious at the time  Had similar symptoms back in December 2021, EKG normal sinus rhythm, was referred to cardiology cardiology did echocardiogram  Since that time she reports that the palpitations have resolved   Denies chest pain or SOB     She reports about 3 weeks ago she had GERD.   She reports that her diet was off   Lasted about 4-5 days, she had used tums and these symptoms involved   Pain did not radiate down arms or up into neck         History obtained from : patient  Review of Systems   Constitutional:  Negative for activity change, appetite change, chills, diaphoresis and fever.   HENT:  Negative for congestion, ear discharge, ear pain, postnasal drip, rhinorrhea, sinus pressure, sinus pain and sore throat.    Eyes:  Negative for pain, discharge, itching and visual disturbance.   Respiratory:  Negative for cough, chest tightness, shortness of breath and wheezing.    Cardiovascular:  Positive for palpitations. Negative for chest pain and leg swelling.   Gastrointestinal:  Negative for abdominal pain, constipation, diarrhea, nausea and vomiting.   Endocrine: Negative for polydipsia, polyphagia and polyuria.   Genitourinary:  Negative for difficulty urinating, dysuria and urgency.   Musculoskeletal:  Negative for arthralgias, back pain and neck pain.   Skin:  Negative for rash and wound.   Neurological:  Negative for dizziness, weakness, numbness and headaches.           Objective     /90 (BP Location: Left arm, Patient Position: Sitting, Cuff Size: Standard)   Pulse 73   Temp 98.4 °F (36.9 °C) (Temporal)   Ht 5' 9.29\" (1.76 m)   Wt 81.5 kg (179 lb 9.6 oz)   SpO2 97%   BMI 26.30 kg/m²     Physical Exam  Constitutional:       General: She is not in acute distress.     Appearance: She is well-developed. She is not diaphoretic. "   HENT:      Head: Normocephalic and atraumatic.      Right Ear: External ear normal.      Left Ear: External ear normal.      Nose: Nose normal.      Mouth/Throat:      Mouth: Mucous membranes are moist.      Pharynx: No oropharyngeal exudate or posterior oropharyngeal erythema.   Eyes:      General:         Right eye: No discharge.         Left eye: No discharge.      Conjunctiva/sclera: Conjunctivae normal.      Pupils: Pupils are equal, round, and reactive to light.   Neck:      Thyroid: No thyromegaly.   Cardiovascular:      Rate and Rhythm: Normal rate and regular rhythm.      Heart sounds: Normal heart sounds. No murmur heard.     No friction rub. No gallop.   Pulmonary:      Effort: Pulmonary effort is normal. No respiratory distress.      Breath sounds: Normal breath sounds. No stridor. No wheezing or rales.   Abdominal:      General: Bowel sounds are normal. There is no distension.      Palpations: Abdomen is soft.      Tenderness: There is no abdominal tenderness.   Musculoskeletal:      Cervical back: Normal range of motion and neck supple.   Lymphadenopathy:      Cervical: No cervical adenopathy.   Skin:     General: Skin is warm and dry.      Findings: No erythema or rash.   Neurological:      Mental Status: She is alert and oriented to person, place, and time.   Psychiatric:         Behavior: Behavior normal.         Thought Content: Thought content normal.         Judgment: Judgment normal.

## 2024-11-15 ENCOUNTER — HOSPITAL ENCOUNTER (OUTPATIENT)
Dept: NON INVASIVE DIAGNOSTICS | Facility: CLINIC | Age: 74
Discharge: HOME | End: 2024-11-15
Payer: MEDICARE

## 2024-11-15 ENCOUNTER — PATIENT MESSAGE (OUTPATIENT)
Age: 74
End: 2024-11-15

## 2024-11-15 DIAGNOSIS — R00.2 PALPITATIONS: ICD-10-CM

## 2024-11-15 PROCEDURE — 93226 XTRNL ECG REC<48 HR SCAN A/R: CPT

## 2024-11-15 PROCEDURE — 93225 XTRNL ECG REC<48 HRS REC: CPT

## 2024-11-26 LAB
ALBUMIN SERPL-MCNC: 4.3 G/DL (ref 3.5–5.7)
ALP SERPL-CCNC: 48 U/L (ref 35–120)
ALT SERPL-CCNC: 13 U/L
ANION GAP SERPL CALCULATED.3IONS-SCNC: 8 MMOL/L (ref 3–11)
AST SERPL-CCNC: 15 U/L
BASOPHILS # BLD AUTO: 0.1 THOU/CMM (ref 0–0.1)
BASOPHILS NFR BLD AUTO: 1 %
BILIRUB SERPL-MCNC: 1.1 MG/DL (ref 0.2–1)
BUN SERPL-MCNC: 18 MG/DL (ref 7–25)
CALCIUM SERPL-MCNC: 9.1 MG/DL (ref 8.5–10.5)
CHLORIDE SERPL-SCNC: 103 MMOL/L (ref 100–109)
CHOLEST SERPL-MCNC: 212 MG/DL
CHOLEST/HDLC SERPL: 3.2 {RATIO}
CO2 SERPL-SCNC: 30 MMOL/L (ref 21–31)
CREAT SERPL-MCNC: 0.75 MG/DL (ref 0.4–1.1)
CYTOLOGY CMNT CVX/VAG CYTO-IMP: ABNORMAL
DIFFERENTIAL METHOD BLD: NORMAL
EOSINOPHIL # BLD AUTO: 0.3 THOU/CMM (ref 0–0.5)
EOSINOPHIL NFR BLD AUTO: 6 %
ERYTHROCYTE [DISTWIDTH] IN BLOOD BY AUTOMATED COUNT: 13.9 % (ref 12–16)
GFR/BSA.PRED SERPLBLD CYS-BASED-ARV: 83 ML/MIN/{1.73_M2}
GLUCOSE SERPL-MCNC: 94 MG/DL (ref 65–99)
HCT VFR BLD AUTO: 40.7 % (ref 35–43)
HDLC SERPL-MCNC: 67 MG/DL (ref 23–92)
HGB BLD-MCNC: 13.5 G/DL (ref 11.5–14.5)
LDLC SERPL CALC-MCNC: 126 MG/DL
LYMPHOCYTES # BLD AUTO: 1.6 THOU/CMM (ref 1–3)
LYMPHOCYTES NFR BLD AUTO: 26 %
MCH RBC QN AUTO: 29.1 PG (ref 26–34)
MCHC RBC AUTO-ENTMCNC: 33.2 G/DL (ref 32–37)
MCV RBC AUTO: 88 FL (ref 80–100)
MONOCYTES # BLD AUTO: 0.5 THOU/CMM (ref 0.3–1)
MONOCYTES NFR BLD AUTO: 8 %
NEUTROPHILS # BLD AUTO: 3.7 THOU/CMM (ref 1.8–7.8)
NEUTROPHILS NFR BLD AUTO: 59 %
NONHDLC SERPL-MCNC: 145 MG/DL
PLATELET # BLD AUTO: 252 THOU/CMM (ref 140–350)
PMV BLD REES-ECKER: 8.2 FL (ref 7.5–11.3)
POTASSIUM SERPL-SCNC: 4.1 MMOL/L (ref 3.5–5.2)
PROT SERPL-MCNC: 6.3 G/DL (ref 6.3–8.3)
RBC # BLD AUTO: 4.65 MILL/CMM (ref 3.7–4.7)
SODIUM SERPL-SCNC: 141 MMOL/L (ref 135–145)
TRIGL SERPL-MCNC: 95 MG/DL
WBC # BLD AUTO: 6.2 THOU/CMM (ref 4–10)

## 2024-12-02 ENCOUNTER — OFFICE VISIT (OUTPATIENT)
Age: 74
End: 2024-12-02
Payer: MEDICARE

## 2024-12-02 VITALS
HEART RATE: 75 BPM | SYSTOLIC BLOOD PRESSURE: 170 MMHG | BODY MASS INDEX: 26.19 KG/M2 | DIASTOLIC BLOOD PRESSURE: 90 MMHG | WEIGHT: 176.8 LBS | TEMPERATURE: 97.9 F | HEIGHT: 69 IN | OXYGEN SATURATION: 98 %

## 2024-12-02 DIAGNOSIS — Z00.00 MEDICARE ANNUAL WELLNESS VISIT, SUBSEQUENT: Primary | ICD-10-CM

## 2024-12-02 DIAGNOSIS — I10 ESSENTIAL HYPERTENSION: ICD-10-CM

## 2024-12-02 PROCEDURE — G0438 PPPS, INITIAL VISIT: HCPCS | Performed by: NURSE PRACTITIONER

## 2024-12-02 PROCEDURE — 99214 OFFICE O/P EST MOD 30 MIN: CPT | Performed by: NURSE PRACTITIONER

## 2024-12-02 RX ORDER — LISINOPRIL 5 MG/1
5 TABLET ORAL DAILY
Start: 2024-12-02

## 2024-12-02 NOTE — PATIENT INSTRUCTIONS
Medicare Preventive Visit Patient Instructions  Thank you for completing your Welcome to Medicare Visit or Medicare Annual Wellness Visit today. Your next wellness visit will be due in one year (12/3/2025).  The screening/preventive services that you may require over the next 5-10 years are detailed below. Some tests may not apply to you based off risk factors and/or age. Screening tests ordered at today's visit but not completed yet may show as past due. Also, please note that scanned in results may not display below.  Preventive Screenings:  Service Recommendations Previous Testing/Comments   Colorectal Cancer Screening  * Colonoscopy    * Fecal Occult Blood Test (FOBT)/Fecal Immunochemical Test (FIT)  * Fecal DNA/Cologuard Test  * Flexible Sigmoidoscopy Age: 45-75 years old   Colonoscopy: every 10 years (may be performed more frequently if at higher risk)  OR  FOBT/FIT: every 1 year  OR  Cologuard: every 3 years  OR  Sigmoidoscopy: every 5 years  Screening may be recommended earlier than age 45 if at higher risk for colorectal cancer. Also, an individualized decision between you and your healthcare provider will decide whether screening between the ages of 76-85 would be appropriate. Colonoscopy: 05/01/2023  FOBT/FIT: Not on file  Cologuard: Not on file  Sigmoidoscopy: Not on file    Screening Current     Breast Cancer Screening Age: 40+ years old  Frequency: every 1-2 years  Not required if history of left and right mastectomy Mammogram: 06/25/2024    Screening Current   Cervical Cancer Screening Between the ages of 21-29, pap smear recommended once every 3 years.   Between the ages of 30-65, can perform pap smear with HPV co-testing every 5 years.   Recommendations may differ for women with a history of total hysterectomy, cervical cancer, or abnormal pap smears in past. Pap Smear: Not on file    Screening Not Indicated   Hepatitis C Screening Once for adults born between 1945 and 1965  More frequently in  patients at high risk for Hepatitis C Hep C Antibody: Not on file        Diabetes Screening 1-2 times per year if you're at risk for diabetes or have pre-diabetes Fasting glucose: No results in last 5 years (No results in last 5 years)  A1C: 5.4 % (11/12/2021)  Screening Current   Cholesterol Screening Once every 5 years if you don't have a lipid disorder. May order more often based on risk factors. Lipid panel: 11/26/2024    Screening Not Indicated  History Lipid Disorder     Other Preventive Screenings Covered by Medicare:  Abdominal Aortic Aneurysm (AAA) Screening: covered once if your at risk. You're considered to be at risk if you have a family history of AAA.  Lung Cancer Screening: covers low dose CT scan once per year if you meet all of the following conditions: (1) Age 55-77; (2) No signs or symptoms of lung cancer; (3) Current smoker or have quit smoking within the last 15 years; (4) You have a tobacco smoking history of at least 20 pack years (packs per day multiplied by number of years you smoked); (5) You get a written order from a healthcare provider.  Glaucoma Screening: covered annually if you're considered high risk: (1) You have diabetes OR (2) Family history of glaucoma OR (3)  aged 50 and older OR (4)  American aged 65 and older  Osteoporosis Screening: covered every 2 years if you meet one of the following conditions: (1) You're estrogen deficient and at risk for osteoporosis based off medical history and other findings; (2) Have a vertebral abnormality; (3) On glucocorticoid therapy for more than 3 months; (4) Have primary hyperparathyroidism; (5) On osteoporosis medications and need to assess response to drug therapy.   Last bone density test (DXA Scan): 09/23/2021.  HIV Screening: covered annually if you're between the age of 15-65. Also covered annually if you are younger than 15 and older than 65 with risk factors for HIV infection. For pregnant patients, it is  covered up to 3 times per pregnancy.    Immunizations:  Immunization Recommendations   Influenza Vaccine Annual influenza vaccination during flu season is recommended for all persons aged >= 6 months who do not have contraindications   Pneumococcal Vaccine   * Pneumococcal conjugate vaccine = PCV13 (Prevnar 13), PCV15 (Vaxneuvance), PCV20 (Prevnar 20)  * Pneumococcal polysaccharide vaccine = PPSV23 (Pneumovax) Adults 19-63 yo with certain risk factors or if 65+ yo  If never received any pneumonia vaccine: recommend Prevnar 20 (PCV20)  Give PCV20 if previously received 1 dose of PCV13 or PPSV23   Hepatitis B Vaccine 3 dose series if at intermediate or high risk (ex: diabetes, end stage renal disease, liver disease)   Respiratory syncytial virus (RSV) Vaccine - COVERED BY MEDICARE PART D  * RSVPreF3 (Arexvy) CDC recommends that adults 60 years of age and older may receive a single dose of RSV vaccine using shared clinical decision-making (SCDM)   Tetanus (Td) Vaccine - COST NOT COVERED BY MEDICARE PART B Following completion of primary series, a booster dose should be given every 10 years to maintain immunity against tetanus. Td may also be given as tetanus wound prophylaxis.   Tdap Vaccine - COST NOT COVERED BY MEDICARE PART B Recommended at least once for all adults. For pregnant patients, recommended with each pregnancy.   Shingles Vaccine (Shingrix) - COST NOT COVERED BY MEDICARE PART B  2 shot series recommended in those 19 years and older who have or will have weakened immune systems or those 50 years and older     Health Maintenance Due:      Topic Date Due   • Hepatitis C Screening  Never done   • Breast Cancer Screening: Mammogram  06/25/2025   • Colorectal Cancer Screening  05/01/2030     Immunizations Due:      Topic Date Due   • Pneumococcal Vaccine: 65+ Years (2 of 2 - PPSV23 or PCV20) 11/19/2022   • COVID-19 Vaccine (5 - 2024-25 season) 09/01/2024     Advance Directives   What are advance directives?   Advance directives are legal documents that state your wishes and plans for medical care. These plans are made ahead of time in case you lose your ability to make decisions for yourself. Advance directives can apply to any medical decision, such as the treatments you want, and if you want to donate organs.   What are the types of advance directives?  There are many types of advance directives, and each state has rules about how to use them. You may choose a combination of any of the following:  Living will:  This is a written record of the treatment you want. You can also choose which treatments you do not want, which to limit, and which to stop at a certain time. This includes surgery, medicine, IV fluid, and tube feedings.   Durable power of  for healthcare (DPAHC):  This is a written record that states who you want to make healthcare choices for you when you are unable to make them for yourself. This person, called a proxy, is usually a family member or a friend. You may choose more than 1 proxy.  Do not resuscitate (DNR) order:  A DNR order is used in case your heart stops beating or you stop breathing. It is a request not to have certain forms of treatment, such as CPR. A DNR order may be included in other types of advance directives.  Medical directive:  This covers the care that you want if you are in a coma, near death, or unable to make decisions for yourself. You can list the treatments you want for each condition. Treatment may include pain medicine, surgery, blood transfusions, dialysis, IV or tube feedings, and a ventilator (breathing machine).  Values history:  This document has questions about your views, beliefs, and how you feel and think about life. This information can help others choose the care that you would choose.  Why are advance directives important?  An advance directive helps you control your care. Although spoken wishes may be used, it is better to have your wishes written down.  Spoken wishes can be misunderstood, or not followed. Treatments may be given even if you do not want them. An advance directive may make it easier for your family to make difficult choices about your care.   Weight Management   Why it is important to manage your weight:  Being overweight increases your risk of health conditions such as heart disease, high blood pressure, type 2 diabetes, and certain types of cancer. It can also increase your risk for osteoarthritis, sleep apnea, and other respiratory problems. Aim for a slow, steady weight loss. Even a small amount of weight loss can lower your risk of health problems.  How to lose weight safely:  A safe and healthy way to lose weight is to eat fewer calories and get regular exercise. You can lose up about 1 pound a week by decreasing the number of calories you eat by 500 calories each day.   Healthy meal plan for weight management:  A healthy meal plan includes a variety of foods, contains fewer calories, and helps you stay healthy. A healthy meal plan includes the following:  Eat whole-grain foods more often.  A healthy meal plan should contain fiber. Fiber is the part of grains, fruits, and vegetables that is not broken down by your body. Whole-grain foods are healthy and provide extra fiber in your diet. Some examples of whole-grain foods are whole-wheat breads and pastas, oatmeal, brown rice, and bulgur.  Eat a variety of vegetables every day.  Include dark, leafy greens such as spinach, kale, marcus greens, and mustard greens. Eat yellow and orange vegetables such as carrots, sweet potatoes, and winter squash.   Eat a variety of fruits every day.  Choose fresh or canned fruit (canned in its own juice or light syrup) instead of juice. Fruit juice has very little or no fiber.  Eat low-fat dairy foods.  Drink fat-free (skim) milk or 1% milk. Eat fat-free yogurt and low-fat cottage cheese. Try low-fat cheeses such as mozzarella and other reduced-fat  cheeses.  Choose meat and other protein foods that are low in fat.  Choose beans or other legumes such as split peas or lentils. Choose fish, skinless poultry (chicken or turkey), or lean cuts of red meat (beef or pork). Before you cook meat or poultry, cut off any visible fat.   Use less fat and oil.  Try baking foods instead of frying them. Add less fat, such as margarine, sour cream, regular salad dressing and mayonnaise to foods. Eat fewer high-fat foods. Some examples of high-fat foods include french fries, doughnuts, ice cream, and cakes.  Eat fewer sweets.  Limit foods and drinks that are high in sugar. This includes candy, cookies, regular soda, and sweetened drinks.  Exercise:  Exercise at least 30 minutes per day on most days of the week. Some examples of exercise include walking, biking, dancing, and swimming. You can also fit in more physical activity by taking the stairs instead of the elevator or parking farther away from stores. Ask your healthcare provider about the best exercise plan for you.      © Copyright [employer] 2018 Information is for End User's use only and may not be sold, redistributed or otherwise used for commercial purposes. All illustrations and images included in CareNotes® are the copyrighted property of A.D.A[employer]M., Inc. or [employer]

## 2024-12-02 NOTE — PROGRESS NOTES
Name: Shira Perez      : 1950      MRN: [id number]  Encounter Provider: LINDA Goodson  Encounter Date: 2024   Encounter department: [facility] [place]    Assessment & Plan  Medicare annual wellness visit, subsequent         Essential hypertension    Orders:    lisinopril (ZESTRIL) 5 mg tablet; Take 1 tablet (5 mg total) by mouth daily       Preventive health issues were discussed with patient, and age appropriate screening tests were ordered as noted in patient's After Visit Summary. Personalized health advice and appropriate referrals for health education or preventive services given if needed, as noted in patient's After Visit Summary.    History of Present Illness     Patient presents today for follow up and AWV.    Palpitations-Holter monitor results below:  1. Predominantly sinus rhythm during the study period with an average HR of 76 bpm ( bpm).  2. Minimal supraventricular and ventricular ectopy burden, mostly in the form of single PACs and PVCs.   Did not complete ordered blood work     At last office visit patient also noted to be bradycardic, metoprolol was reduced to 12.5 mg and a low-dose lisinopril was added, patient reported headaches and cough from lisinopril, she had stopped her lisinopril and continued on metoprolol     Note from last office visit:  She reports about 6 weeks ago she had palpitations.   She reports that she had about 2 days of palpation  She was having trouble sleeping and she took a THC gummy to help her sleep  She did not feel anxious at the time  Had similar symptoms back in 2021, EKG normal sinus rhythm, was referred to cardiology cardiology did echocardiogram  Since that time she reports that the palpitations have resolved   Denies chest pain or SOB     She reports about 3 weeks ago she had GERD.   She reports that her diet was off   Lasted about 4-5 days, she had used tums and these symptoms  involved   Pain did not radiate down arms or up into neck        Patient Care Team:  LINDA Goodson as PCP - General (Family Medicine)  Tessie Harper MD    Review of Systems   Constitutional:  Negative for activity change, appetite change, chills, diaphoresis and fever.   HENT:  Negative for congestion, ear discharge, ear pain, postnasal drip, rhinorrhea, sinus pressure, sinus pain and sore throat.    Eyes:  Negative for pain, discharge, itching and visual disturbance.   Respiratory:  Negative for cough, chest tightness, shortness of breath and wheezing.    Cardiovascular:  Negative for chest pain, palpitations and leg swelling.   Gastrointestinal:  Negative for abdominal pain, constipation, diarrhea, nausea and vomiting.   Endocrine: Negative for polydipsia, polyphagia and polyuria.   Genitourinary:  Negative for difficulty urinating, dysuria and urgency.   Musculoskeletal:  Negative for arthralgias, back pain and neck pain.   Skin:  Negative for rash and wound.   Neurological:  Negative for dizziness, weakness, numbness and headaches.     Medical History Reviewed by provider this encounter:       Annual Wellness Visit Questionnaire   Shira is here for her Subsequent Wellness visit.     Health Risk Assessment:   Patient rates overall health as good. Patient feels that their physical health rating is same. Patient is satisfied with their life. Eyesight was rated as same. Hearing was rated as same. Patient feels that their emotional and mental health rating is same. Patients states they are never, rarely angry. Patient states they are never, rarely unusually tired/fatigued. Pain experienced in the last 7 days has been some. Patient's pain rating has been 1/10. Patient states that she has experienced no weight loss or gain in last 6 months.     Depression Screening:   PHQ-2 Score: 0      Fall Risk Screening:   In the past year, patient has experienced: no history of falling in past year      Urinary  Incontinence Screening:   Patient has not leaked urine accidently in the last six months.     Home Safety:  Patient does not have trouble with stairs inside or outside of their home. Patient has working smoke alarms and has working carbon monoxide detector. Home safety hazards include: none.     Nutrition:   Current diet is Regular and Limited junk food.     Medications:   Patient is currently taking over-the-counter supplements. OTC medications include: see medication list. Patient is able to manage medications.     Activities of Daily Living (ADLs)/Instrumental Activities of Daily Living (IADLs):   Walk and transfer into and out of bed and chair?: Yes  Dress and groom yourself?: Yes    Bathe or shower yourself?: Yes    Feed yourself? Yes  Do your laundry/housekeeping?: Yes  Manage your money, pay your bills and track your expenses?: Yes  Make your own meals?: Yes    Do your own shopping?: Yes    Previous Hospitalizations:   Any hospitalizations or ED visits within the last 12 months?: No      Advance Care Planning:   Living will: Yes    Durable POA for healthcare: Yes    Advanced directive: Yes    Advanced directive counseling given: Yes    ACP document given: Yes      Cognitive Screening:   Provider or family/friend/caregiver concerned regarding cognition?: No    PREVENTIVE SCREENINGS      Cardiovascular Screening:    General: Screening Not Indicated, History Lipid Disorder and Screening Current      Diabetes Screening:     General: Screening Current      Colorectal Cancer Screening:     General: Screening Current      Breast Cancer Screening:     General: Screening Current      Cervical Cancer Screening:    General: Screening Not Indicated      Osteoporosis Screening:    General: Risks and Benefits Discussed and Screening Current      Abdominal Aortic Aneurysm (AAA) Screening:        General: Screening Not Indicated      Lung Cancer Screening:     General: Screening Not Indicated      Hepatitis C Screening:     "General: Screening Not Indicated    Screening, Brief Intervention, and Referral to Treatment (SBIRT)    Screening  Typical number of drinks in a day: 1  Typical number of drinks in a week: 5  Interpretation: Low risk drinking behavior.    Other Counseling Topics:   Car/seat belt/driving safety, skin self-exam, sunscreen and regular weightbearing exercise and calcium and vitamin D intake.     Social Drivers of Health     Financial Resource Strain: Low Risk  (12/1/2023)    Overall Financial Resource Strain (CARDIA)     Difficulty of Paying Living Expenses: Not hard at all   Food Insecurity: No Food Insecurity (12/2/2024)    Hunger Vital Sign     Worried About Running Out of Food in the Last Year: Never true     Ran Out of Food in the Last Year: Never true   Transportation Needs: No Transportation Needs (12/2/2024)    PRAPARE - Transportation     Lack of Transportation (Medical): No     Lack of Transportation (Non-Medical): No   Housing Stability: Low Risk  (12/2/2024)    Housing Stability Vital Sign     Unable to Pay for Housing in the Last Year: No     Number of Times Moved in the Last Year: 0     Homeless in the Last Year: No   Utilities: Not At Risk (12/2/2024)    [facility] Utilities     Threatened with loss of utilities: No     No results found.    Objective   /90   Pulse 75   Temp 97.9 °F (36.6 °C) (Temporal)   Ht 5' 9.29\" (1.76 m)   Wt 80.2 kg (176 lb 12.8 oz)   SpO2 98%   BMI 25.89 kg/m²     Physical Exam  Constitutional:       General: She is not in acute distress.     Appearance: She is well-developed. She is not diaphoretic.   HENT:      Head: Normocephalic and atraumatic.      Right Ear: External ear normal.      Left Ear: External ear normal.      Nose: Nose normal.      Mouth/Throat:      Mouth: Mucous membranes are moist.      Pharynx: No oropharyngeal exudate or posterior oropharyngeal erythema.   Eyes:      General:         Right eye: No discharge.         Left eye: No discharge.      " Conjunctiva/sclera: Conjunctivae normal.      Pupils: Pupils are equal, round, and reactive to light.   Neck:      Thyroid: No thyromegaly.   Cardiovascular:      Rate and Rhythm: Normal rate and regular rhythm.      Heart sounds: Normal heart sounds. No murmur heard.     No friction rub. No gallop.   Pulmonary:      Effort: Pulmonary effort is normal. No respiratory distress.      Breath sounds: Normal breath sounds. No stridor. No wheezing or rales.   Abdominal:      General: Bowel sounds are normal. There is no distension.      Palpations: Abdomen is soft.      Tenderness: There is no abdominal tenderness.   Musculoskeletal:      Cervical back: Normal range of motion and neck supple.   Lymphadenopathy:      Cervical: No cervical adenopathy.   Skin:     General: Skin is warm and dry.      Findings: No erythema or rash.   Neurological:      Mental Status: She is alert and oriented to person, place, and time.   Psychiatric:         Behavior: Behavior normal.         Thought Content: Thought content normal.         Judgment: Judgment normal.

## 2024-12-02 NOTE — PROGRESS NOTES
Name: Shira Perez      : 1950      MRN: [id number]  Encounter Provider: LINDA Goodson  Encounter Date: 2024   Encounter department: [facility]  :  Assessment & Plan           History of Present Illness {?Quick Links Encounters * My Last Note * Last Note in Specialty * Snapshot * Since Last Visit * History :77584}    Patient presents today for follow up and AWV.    Palpitations-Holter monitor results below:  1. Predominantly sinus rhythm during the study period with an average HR of 76 bpm ( bpm).  2. Minimal supraventricular and ventricular ectopy burden, mostly in the form of single PACs and PVCs.   Did not complete ordered blood work     At last office visit patient also noted to be bradycardic, metoprolol was reduced to 12.5 mg and a low-dose lisinopril was added, patient reported headaches and cough from lisinopril, she had stopped her lisinopril and went back to her original dose of metoprolol.    Note from last office visit:  She reports about 6 weeks ago she had palpitations.   She reports that she had about 2 days of palpation  She was having trouble sleeping and she took a THC gummy to help her sleep  She did not feel anxious at the time  Had similar symptoms back in 2021, EKG normal sinus rhythm, was referred to cardiology cardiology did echocardiogram  Since that time she reports that the palpitations have resolved   Denies chest pain or SOB     She reports about 3 weeks ago she had GERD.   She reports that her diet was off   Lasted about 4-5 days, she had used tums and these symptoms involved   Pain did not radiate down arms or up into neck     Review of Systems  {Select to insert medical history sections (Optional):32535}     Objective {?Quick Links Trend Vitals * Enter New Vitals * Results Review * Timeline (Adult) * Labs * Imaging * Cardiology * Procedures * Lung Cancer Screening * Surgical eConsent  :62132}  There were no vitals taken for this visit.     Physical Exam  {Administrative / Billing Section (Optional):46085}

## 2024-12-12 LAB
FERRITIN SERPL-MCNC: 148 NG/ML (ref 11–306.8)
IRON SATN MFR SERPL: 24 % (ref 20–50)
IRON SERPL-MCNC: 61 UG/DL (ref 50–212)
TIBC SERPL-MCNC: 252 UG/DL (ref 260–430)
TRANSFERRIN SERPL-MCNC: 180 MG/DL (ref 203–362)
TSH SERPL-ACNC: 0.78 UIU/ML (ref 0.45–5.33)

## 2024-12-16 ENCOUNTER — OFFICE VISIT (OUTPATIENT)
Age: 74
End: 2024-12-16
Payer: MEDICARE

## 2024-12-16 VITALS
BODY MASS INDEX: 26.51 KG/M2 | WEIGHT: 179 LBS | DIASTOLIC BLOOD PRESSURE: 96 MMHG | SYSTOLIC BLOOD PRESSURE: 160 MMHG | TEMPERATURE: 97.7 F | OXYGEN SATURATION: 99 % | HEART RATE: 88 BPM | HEIGHT: 69 IN

## 2024-12-16 DIAGNOSIS — F41.9 ANXIETY: Primary | ICD-10-CM

## 2024-12-16 DIAGNOSIS — I10 ESSENTIAL HYPERTENSION: ICD-10-CM

## 2024-12-16 PROCEDURE — G2211 COMPLEX E/M VISIT ADD ON: HCPCS | Performed by: NURSE PRACTITIONER

## 2024-12-16 PROCEDURE — 99214 OFFICE O/P EST MOD 30 MIN: CPT | Performed by: NURSE PRACTITIONER

## 2024-12-16 RX ORDER — LISINOPRIL 10 MG/1
10 TABLET ORAL DAILY
Qty: 90 TABLET | Refills: 1 | Status: SHIPPED | OUTPATIENT
Start: 2024-12-16

## 2024-12-16 RX ORDER — LISINOPRIL 5 MG/1
5 TABLET ORAL DAILY
Qty: 90 TABLET | Refills: 1 | Status: SHIPPED | OUTPATIENT
Start: 2024-12-16

## 2024-12-16 RX ORDER — ALPRAZOLAM 0.25 MG/1
0.25 TABLET ORAL
Qty: 30 TABLET | Refills: 0 | Status: SHIPPED | OUTPATIENT
Start: 2024-12-16

## 2024-12-16 RX ORDER — SERTRALINE HYDROCHLORIDE 25 MG/1
12.5 TABLET, FILM COATED ORAL DAILY
Qty: 90 TABLET | Refills: 1 | Status: SHIPPED | OUTPATIENT
Start: 2024-12-16 | End: 2025-06-14

## 2024-12-16 NOTE — ASSESSMENT & PLAN NOTE
-remains uncontrolled   -Continue metoprolol 12.5 mg tablet daily  -Increase lisinopril to 15 mg tablet daily  -Follow-up in 1 month for repeat blood pressure check  -Continue to monitor blood pressure at home.  Goal BP is < 130/80.  Contact our office for consistent elevations.  Recommend low sodium diet.  Exercise 30 minutes 5 times a week as tolerated.  Recommend yearly eye exam.     Orders:    lisinopril (ZESTRIL) 10 mg tablet; Take 1 tablet (10 mg total) by mouth daily    lisinopril (ZESTRIL) 5 mg tablet; Take 1 tablet (5 mg total) by mouth daily

## 2024-12-16 NOTE — ASSESSMENT & PLAN NOTE
-Controlled substance agreement on file  -Start Zoloft 12.5 mg tablet daily  -Start Xanax as needed advised not to take with alcohol or marijuana    Orders:    ALPRAZolam (XANAX) 0.25 mg tablet; Take 1 tablet (0.25 mg total) by mouth daily at bedtime as needed for anxiety    sertraline (ZOLOFT) 25 mg tablet; Take 0.5 tablets (12.5 mg total) by mouth daily     Pharmacokinetic Assessment Follow Up: IV Vancomycin    Vancomycin serum concentration assessment(s):    The random level was drawn incorrectly and cannot be used to guide therapy at this time.  Vanco trough was still elevated (20.5)    Vancomycin Regimen Plan:    Re-dose when the random level is less than 20 mcg/mL, next level to be drawn at 0400 on 6/22/2020    Drug levels (last 3 results):  Recent Labs   Lab Result Units 06/19/20  0622 06/20/20  0538 06/21/20  0539   Vancomycin, Random ug/mL 20.5 18.1 20.5       Pharmacy will continue to follow and monitor vancomycin.    Please contact pharmacy at extension [phone] for questions regarding this assessment.    Thank you for the consult,   Keny Matute       Patient brief summary:  Tasneem Lynn is a 74 y.o. female initiated on antimicrobial therapy with IV Vancomycin for treatment of skin & soft tissue infection    The patient's current regimen is Pulse dosing with dose being administered when daily random level is < 20.    Drug Allergies:   Review of patient's allergies indicates:   Allergen Reactions    Corticosteroids (glucocorticoids) Edema       Actual Body Weight:   116.5 kg    Renal Function:   Estimated Creatinine Clearance: 20.6 mL/min (A) (based on SCr of 2.9 mg/dL (H)).,     Dialysis Method (if applicable):  N/A    CBC (last 72 hours):  Recent Labs   Lab Result Units 06/19/20  0622 06/20/20  0538   WBC K/uL 10.35 10.09   Hemoglobin g/dL 9.7* 8.8*   Hematocrit % 31.7* 28.9*   Platelets K/uL 378* 366*   Gran% % 36.1* 41.5   Lymph% % 47.5 43.1   Mono% % 10.5 9.9   Eosinophil% % 4.9 4.5   Basophil% % 0.5 0.5   Differential Method  Automated Automated       Metabolic Panel (last 72 hours):  Recent Labs   Lab Result Units 06/18/20  1115 06/18/20  1407 06/18/20  1818 06/19/20  0622 06/20/20  0537   Sodium mmol/L  --   --   --  132*  132* 132*  132*   Sodium Urine Random mmol/L 25  --   --   --   --    Potassium mmol/L  --  5.9* 5.0 4.9  4.9 4.2  4.2    Chloride mmol/L  --   --   --  100  100 100  100   CO2 mmol/L  --   --   --  23  23 25  25   Glucose mg/dL  --   --   --  241*  241* 226*  226*   Glucose, UA  1+*  --   --   --   --    BUN, Bld mg/dL  --   --   --  84*  84* 86*  86*   Creatinine mg/dL  --   --   --  3.1*  3.1* 2.9*  2.9*   Creatinine, Random Ur mg/dL 127.5  --   --   --   --    Albumin g/dL  --   --   --  2.4*  2.4* 2.3*  2.3*   Total Bilirubin mg/dL  --   --   --  0.2 0.1   Alkaline Phosphatase U/L  --   --   --  121 109   AST U/L  --   --   --  12 13   ALT U/L  --   --   --  11 12   Phosphorus mg/dL  --   --   --  4.9*  4.9* 5.0*  5.0*       Vancomycin Administrations:  vancomycin given in the last 96 hours                     vancomycin 500 mg in dextrose 5 % 100 mL IVPB (ready to mix system) (mg) 500 mg New Bag 06/20/20 0952    vancomycin 500 mg in dextrose 5 % 100 mL IVPB (ready to mix system) (mg) 500 mg New Bag 06/18/20 0646    vancomycin 500 mg in dextrose 5 % 100 mL IVPB (ready to mix system) (mg) 500 mg New Bag 06/17/20 0830                    Microbiologic Results:  Microbiology Results (last 7 days)       Procedure Component Value Units Date/Time    Urine culture [[id number]]  (Abnormal)  (Susceptibility) Collected: 06/18/20 1115    Order Status: Completed Specimen: Urine Updated: 06/20/20 1120     Urine Culture, Routine ESBL Results called to and read back by:ELY SOTO 06/20/2020  09:33      KLEBSIELLA PNEUMONIAE ESBL  >100,000 cfu/ml      Narrative:      Preferred Collection Type->Urine, Clean Catch  Specimen Source->Urine    Blood culture [[id number]] Collected: 06/15/20 0624    Order Status: Completed Specimen: Blood from Antecubital, Right Updated: 06/19/20 0703     Blood Culture, Routine No Growth after 4 days.     Aerobic culture [[id number]] Collected: 06/13/20 1404    Order Status: Completed Specimen: Wound from Foot, Right Updated: 06/17/20 1108     Aerobic Bacterial Culture No growth    Culture, Anaerobe  [[id number]] Collected: 06/13/20 1405    Order Status: Completed Specimen: Wound from Foot, Right Updated: 06/17/20 0810     Anaerobic Culture No anaerobes isolated    Blood Culture #1 **CANNOT BE ORDERED STAT** [[id number]] Collected: 06/12/20 2325    Order Status: Completed Specimen: Blood from Peripheral, Antecubital, Right Updated: 06/17/20 0503     Blood Culture, Routine No Growth after 4 days.     Blood Culture #2 **CANNOT BE ORDERED STAT** [[id number]]  (Abnormal) Collected: 06/12/20 2339    Order Status: Completed Specimen: Blood from Peripheral, Hand, Right Updated: 06/16/20 0748     Blood Culture, Routine Gram stain aer bottle: Gram positive cocci in clusters resembling Staph       Results called to and read back by: Nataliia Weber  06/13/2020  22:41      COAGULASE-NEGATIVE STAPHYLOCOCCUS SPECIES  Organism is a probable contaminant      AFB Culture & Smear [[id number]] Collected: 06/13/20 1405    Order Status: Completed Specimen: Wound from Foot, Right Updated: 06/15/20 2127     AFB Culture & Smear Culture in progress     AFB CULTURE STAIN No acid fast bacilli seen.

## 2025-01-07 ENCOUNTER — OFFICE VISIT (OUTPATIENT)
Age: 75
End: 2025-01-07
Payer: MEDICARE

## 2025-01-07 VITALS
OXYGEN SATURATION: 98 % | BODY MASS INDEX: 25.92 KG/M2 | WEIGHT: 175 LBS | DIASTOLIC BLOOD PRESSURE: 89 MMHG | HEART RATE: 90 BPM | HEIGHT: 69 IN | SYSTOLIC BLOOD PRESSURE: 139 MMHG | TEMPERATURE: 98 F

## 2025-01-07 DIAGNOSIS — F41.9 ANXIETY: ICD-10-CM

## 2025-01-07 DIAGNOSIS — I10 ESSENTIAL HYPERTENSION: ICD-10-CM

## 2025-01-07 DIAGNOSIS — Z13.228 SCREENING FOR METABOLIC DISORDER: Primary | ICD-10-CM

## 2025-01-07 DIAGNOSIS — D03.71 MELANOMA IN SITU OF RIGHT LOWER EXTREMITY INCLUDING HIP (HCC): ICD-10-CM

## 2025-01-07 DIAGNOSIS — E78.5 HYPERLIPIDEMIA, UNSPECIFIED HYPERLIPIDEMIA TYPE: ICD-10-CM

## 2025-01-07 PROBLEM — R00.1 BRADYCARDIA: Status: RESOLVED | Noted: 2024-10-29 | Resolved: 2025-01-07

## 2025-01-07 PROCEDURE — 99214 OFFICE O/P EST MOD 30 MIN: CPT | Performed by: NURSE PRACTITIONER

## 2025-01-07 PROCEDURE — G2211 COMPLEX E/M VISIT ADD ON: HCPCS | Performed by: NURSE PRACTITIONER

## 2025-01-07 NOTE — ASSESSMENT & PLAN NOTE
-Controlled substance agreement on file  -controlled on Zoloft 12.5 mg tablet daily  -continue Xanax as needed advised not to take with alcohol or marijuana

## 2025-01-07 NOTE — PROGRESS NOTES
Name: Shira Perez      : 1950      MRN: [id number]  Encounter Provider: LINDA Goodson  Encounter Date: 2025   Encounter department: [facility]  :  Assessment & Plan  Screening for metabolic disorder    Orders:    Comprehensive metabolic panel; Future    CBC and differential    Lipid panel    Hyperlipidemia, unspecified hyperlipidemia type    Orders:    Lipid panel    Melanoma in situ of right lower extremity including hip (HCC)         Essential hypertension  -Mildly elevated while in office today but under better control  -Continue metoprolol 12.5 mg tablet daily  -Continue lisinopril 10 mg tablet daily  -Continue to monitor blood pressure at home.  Goal BP is < 130/80.  Contact our office for consistent elevations.  Recommend low sodium diet.  Exercise 30 minutes 5 times a week as tolerated.  Recommend yearly eye exam.           Anxiety  -Controlled substance agreement on file  -controlled on Zoloft 12.5 mg tablet daily  -continue Xanax as needed advised not to take with alcohol or marijuana           BMI Counseling: Body mass index is 26.16 kg/m². The BMI is above normal. Nutrition recommendations include reducing portion sizes, decreasing overall calorie intake, 3-5 servings of fruits/vegetables daily, and reducing fast food intake. Exercise recommendations include moderate aerobic physical activity for 150 minutes/week.         History of Present Illness     Patient presents today for follow up on HTN and anxiety.    HTN-patient's lisinopril was increased to 15 mg (she reports she has only been taking 10mg) at last office visit and was advised to continue on metoprolol 12.5 mg tablet daily, she reports that she has not been having palpitations, and denies side effects with this medication    Anxiety-at last office visit she was started on Zoloft 12.5 mg tablet and advised to start Xanax as needed, denies side effects, she feels like  "her anxiety is much better controlled  Note from last office visit:  she reports that her anxiety has been very bad lately, she reports that anxiety is typically triggered when she does not sleep well at night,her dog be a trigger as he is aging and needs additional help, she feels that she is anxious quite often, previously had taken Xanax with relief            Review of Systems   Constitutional:  Negative for activity change, appetite change, chills, diaphoresis and fever.   HENT:  Negative for congestion, ear discharge, ear pain, postnasal drip, rhinorrhea, sinus pressure, sinus pain and sore throat.    Eyes:  Negative for pain, discharge, itching and visual disturbance.   Respiratory:  Negative for cough, chest tightness, shortness of breath and wheezing.    Cardiovascular:  Negative for chest pain, palpitations and leg swelling.   Gastrointestinal:  Negative for abdominal pain, constipation, diarrhea, nausea and vomiting.   Endocrine: Negative for polydipsia, polyphagia and polyuria.   Genitourinary:  Negative for difficulty urinating, dysuria and urgency.   Musculoskeletal:  Negative for arthralgias, back pain and neck pain.   Skin:  Negative for rash and wound.   Neurological:  Negative for dizziness, weakness, numbness and headaches.       Objective   /89   Pulse 90   Temp 98 °F (36.7 °C) (Temporal)   Ht 5' 8.58\" (1.742 m)   Wt 79.4 kg (175 lb)   SpO2 98%   BMI 26.16 kg/m²      Physical Exam  Constitutional:       General: She is not in acute distress.     Appearance: She is well-developed. She is not diaphoretic.   HENT:      Head: Normocephalic and atraumatic.      Right Ear: External ear normal.      Left Ear: External ear normal.      Nose: Nose normal.      Mouth/Throat:      Mouth: Mucous membranes are moist.      Pharynx: No oropharyngeal exudate or posterior oropharyngeal erythema.   Eyes:      General:         Right eye: No discharge.         Left eye: No discharge.      " Conjunctiva/sclera: Conjunctivae normal.      Pupils: Pupils are equal, round, and reactive to light.   Neck:      Thyroid: No thyromegaly.   Cardiovascular:      Rate and Rhythm: Normal rate and regular rhythm.      Heart sounds: Normal heart sounds. No murmur heard.     No friction rub. No gallop.   Pulmonary:      Effort: Pulmonary effort is normal. No respiratory distress.      Breath sounds: Normal breath sounds. No stridor. No wheezing or rales.   Abdominal:      General: Bowel sounds are normal. There is no distension.      Palpations: Abdomen is soft.      Tenderness: There is no abdominal tenderness.   Musculoskeletal:      Cervical back: Normal range of motion and neck supple.   Lymphadenopathy:      Cervical: No cervical adenopathy.   Skin:     General: Skin is warm and dry.      Findings: No erythema or rash.   Neurological:      Mental Status: She is alert and oriented to person, place, and time.   Psychiatric:         Behavior: Behavior normal.         Thought Content: Thought content normal.         Judgment: Judgment normal.

## 2025-01-07 NOTE — ASSESSMENT & PLAN NOTE
-Mildly elevated while in office today but under better control  -Continue metoprolol 12.5 mg tablet daily  -Continue lisinopril 10 mg tablet daily  -Continue to monitor blood pressure at home.  Goal BP is < 130/80.  Contact our office for consistent elevations.  Recommend low sodium diet.  Exercise 30 minutes 5 times a week as tolerated.  Recommend yearly eye exam.

## 2025-01-15 DIAGNOSIS — F41.9 ANXIETY: ICD-10-CM

## 2025-01-15 RX ORDER — ALPRAZOLAM 0.25 MG/1
0.25 TABLET ORAL
Qty: 30 TABLET | Refills: 0 | Status: SHIPPED | OUTPATIENT
Start: 2025-01-15

## 2025-01-29 DIAGNOSIS — E78.5 HYPERLIPIDEMIA, UNSPECIFIED HYPERLIPIDEMIA TYPE: ICD-10-CM

## 2025-01-29 RX ORDER — LOVASTATIN 20 MG/1
20 TABLET ORAL DAILY
Qty: 90 TABLET | Refills: 1 | Status: SHIPPED | OUTPATIENT
Start: 2025-01-29

## 2025-02-13 DIAGNOSIS — I10 ESSENTIAL HYPERTENSION: ICD-10-CM

## 2025-02-13 DIAGNOSIS — F41.9 ANXIETY: ICD-10-CM

## 2025-02-13 RX ORDER — SERTRALINE HYDROCHLORIDE 25 MG/1
12.5 TABLET, FILM COATED ORAL DAILY
Qty: 90 TABLET | Refills: 1 | Status: SHIPPED | OUTPATIENT
Start: 2025-02-13 | End: 2025-08-12

## 2025-02-13 RX ORDER — METOPROLOL SUCCINATE 25 MG/1
12.5 TABLET, EXTENDED RELEASE ORAL DAILY
Qty: 90 TABLET | Refills: 1 | Status: SHIPPED | OUTPATIENT
Start: 2025-02-13

## 2025-02-25 DIAGNOSIS — F41.9 ANXIETY: ICD-10-CM

## 2025-02-26 RX ORDER — ALPRAZOLAM 0.25 MG
0.25 TABLET ORAL
Qty: 30 TABLET | Refills: 0 | Status: SHIPPED | OUTPATIENT
Start: 2025-02-26

## 2025-03-14 DIAGNOSIS — I10 ESSENTIAL HYPERTENSION: ICD-10-CM

## 2025-03-14 RX ORDER — LISINOPRIL 5 MG/1
5 TABLET ORAL DAILY
Qty: 90 TABLET | Refills: 1 | Status: SHIPPED | OUTPATIENT
Start: 2025-03-14

## 2025-04-04 ENCOUNTER — TELEPHONE (OUTPATIENT)
Age: 75
End: 2025-04-04

## 2025-04-04 NOTE — TELEPHONE ENCOUNTER
Patient is calling to ask if we can mail the lab scripts that Dr. Beal wants her to complete before her next office visit to her home. Patient's last office visit notes do advise her to complete labs, but I did not see any orders in the patient's chart. Patient prefers to get lab work done at [facility]. Patient states she recalls receiving lab scripts last year but misplaced them. I advised patient that Dr. Beal is not in the office on Fridays and she would have to order the labs on Monday (4/7) when she returns. Patient verified her home address as well. Patient would like a return call once required lab work scripts are mailed out to her home.    Please advise.

## 2025-04-05 DIAGNOSIS — M85.89 OSTEOPENIA OF MULTIPLE SITES: ICD-10-CM

## 2025-04-05 DIAGNOSIS — E55.9 VITAMIN D INSUFFICIENCY: Primary | ICD-10-CM

## 2025-04-24 DIAGNOSIS — F41.9 ANXIETY: ICD-10-CM

## 2025-04-24 DIAGNOSIS — E78.5 HYPERLIPIDEMIA, UNSPECIFIED HYPERLIPIDEMIA TYPE: ICD-10-CM

## 2025-04-24 RX ORDER — LOVASTATIN 20 MG/1
20 TABLET ORAL DAILY
Qty: 90 TABLET | Refills: 1 | Status: SHIPPED | OUTPATIENT
Start: 2025-04-24

## 2025-04-28 RX ORDER — ALPRAZOLAM 0.25 MG
0.25 TABLET ORAL
Qty: 30 TABLET | Refills: 0 | Status: SHIPPED | OUTPATIENT
Start: 2025-04-28

## 2025-04-28 NOTE — TELEPHONE ENCOUNTER
Patient called rx refill line inquiring status on Xanax request. Patient aware it remains pending, awaiting provider review. Allowing 72 hours to gain response. Patient expresses needing the medication for times of difficulty, experiencing loss within her family.     Please review.

## 2025-05-26 DIAGNOSIS — I10 ESSENTIAL HYPERTENSION: ICD-10-CM

## 2025-05-27 RX ORDER — LISINOPRIL 10 MG/1
10 TABLET ORAL DAILY
Qty: 90 TABLET | Refills: 1 | Status: SHIPPED | OUTPATIENT
Start: 2025-05-27

## 2025-06-25 ENCOUNTER — HOSPITAL ENCOUNTER (OUTPATIENT)
Dept: BONE DENSITY | Facility: CLINIC | Age: 75
Discharge: HOME | End: 2025-06-25
Attending: INTERNAL MEDICINE

## 2025-06-25 DIAGNOSIS — M81.0 OSTEOPOROSIS, UNSPECIFIED OSTEOPOROSIS TYPE, UNSPECIFIED PATHOLOGICAL FRACTURE PRESENCE: ICD-10-CM

## 2025-07-01 ENCOUNTER — RA CDI HCC (OUTPATIENT)
Dept: OTHER | Facility: HOSPITAL | Age: 75
End: 2025-07-01

## 2025-07-08 ENCOUNTER — OFFICE VISIT (OUTPATIENT)
Age: 75
End: 2025-07-08
Payer: MEDICARE

## 2025-07-08 VITALS
TEMPERATURE: 97.6 F | OXYGEN SATURATION: 99 % | HEART RATE: 93 BPM | BODY MASS INDEX: 26.1 KG/M2 | SYSTOLIC BLOOD PRESSURE: 160 MMHG | WEIGHT: 176.2 LBS | DIASTOLIC BLOOD PRESSURE: 104 MMHG | HEIGHT: 69 IN

## 2025-07-08 DIAGNOSIS — I10 ESSENTIAL HYPERTENSION: ICD-10-CM

## 2025-07-08 DIAGNOSIS — E78.2 MIXED HYPERLIPIDEMIA: ICD-10-CM

## 2025-07-08 DIAGNOSIS — M85.89 OSTEOPENIA OF MULTIPLE SITES: ICD-10-CM

## 2025-07-08 DIAGNOSIS — Z13.228 SCREENING FOR METABOLIC DISORDER: Primary | ICD-10-CM

## 2025-07-08 DIAGNOSIS — F41.9 ANXIETY: ICD-10-CM

## 2025-07-08 PROCEDURE — G2211 COMPLEX E/M VISIT ADD ON: HCPCS | Performed by: NURSE PRACTITIONER

## 2025-07-08 PROCEDURE — 99214 OFFICE O/P EST MOD 30 MIN: CPT | Performed by: NURSE PRACTITIONER

## 2025-07-08 RX ORDER — LISINOPRIL 20 MG/1
20 TABLET ORAL DAILY
Qty: 90 TABLET | Refills: 1 | Status: SHIPPED | OUTPATIENT
Start: 2025-07-08

## 2025-07-08 NOTE — ASSESSMENT & PLAN NOTE
-elevated while in office today but under better control  -Continue metoprolol 12.5 mg tablet daily  -Increase lisinopril to 20 mg tablet daily  -Continue to monitor blood pressure at home.  Goal BP is < 130/80.  Contact our office for consistent elevations.  Recommend low sodium diet.  Exercise 30 minutes 5 times a week as tolerated.  Recommend yearly eye exam.      Orders:    lisinopril (ZESTRIL) 20 mg tablet; Take 1 tablet (20 mg total) by mouth daily

## 2025-07-08 NOTE — ASSESSMENT & PLAN NOTE
-Controlled substance agreement on file  -controlled on Zoloft 12.5 mg tablet daily, she would like to taper off, we discussed weaning  -continue Xanax as needed advised not to take with alcohol or marijuana

## 2025-07-08 NOTE — PROGRESS NOTES
Name: Shira Perez      : 1950      MRN: [id number]  Encounter Provider: LINDA Goodson  Encounter Date: 2025   Encounter department: [facility]  :  Assessment & Plan  Screening for metabolic disorder    Orders:    Comprehensive metabolic panel    CBC and differential    Lipid panel    Mixed hyperlipidemia  - Due for lipid panel  -Continue lovastatin    Orders:    Lipid panel    Essential hypertension  -elevated while in office today but under better control  -Continue metoprolol 12.5 mg tablet daily  -Increase lisinopril to 20 mg tablet daily  -Continue to monitor blood pressure at home.  Goal BP is < 130/80.  Contact our office for consistent elevations.  Recommend low sodium diet.  Exercise 30 minutes 5 times a week as tolerated.  Recommend yearly eye exam.      Orders:    lisinopril (ZESTRIL) 20 mg tablet; Take 1 tablet (20 mg total) by mouth daily      Anxiety  -Controlled substance agreement on file  -controlled on Zoloft 12.5 mg tablet daily, she would like to taper off, we discussed weaning  -continue Xanax as needed advised not to take with alcohol or marijuana             Osteopenia of multiple sites  - Continue vitamin D and calcium supplementation  -Scheduled for repeat DEXA scan                History of Present Illness   Patient presents today for follow up on HTN and anxiety.    HTN-patient's lisinopril was increased to 15 mg (she reports she has only been taking 10mg) at last office visit and was advised to continue on metoprolol 12.5 mg tablet daily, she reports that she has not been having palpitations, and denies side effects with this medication    Anxiety-patient reports life circumstances have changed and she feels well-controlled at this time and would like to start weaning off of Zoloft  Note form last office visit:  at last office visit she was started on Zoloft 12.5 mg tablet and advised to start Xanax as needed,  "denies side effects, she feels like her anxiety is much better controlled  Note from last office visit:  she reports that her anxiety has been very bad lately, she reports that anxiety is typically triggered when she does not sleep well at night,her dog be a trigger as he is aging and needs additional help, she feels that she is anxious quite often, previously had taken Xanax with relief    Hyperlipidemia-due for lipid panel, ASCVD risk 31.2%           Review of Systems   Constitutional:  Negative for activity change, appetite change, chills, diaphoresis and fever.   HENT:  Negative for congestion, ear discharge, ear pain, postnasal drip, rhinorrhea, sinus pressure, sinus pain and sore throat.    Eyes:  Negative for pain, discharge, itching and visual disturbance.   Respiratory:  Negative for cough, chest tightness, shortness of breath and wheezing.    Cardiovascular:  Negative for chest pain, palpitations and leg swelling.   Gastrointestinal:  Negative for abdominal pain, constipation, diarrhea, nausea and vomiting.   Endocrine: Negative for polydipsia, polyphagia and polyuria.   Genitourinary:  Negative for difficulty urinating, dysuria and urgency.   Musculoskeletal:  Negative for arthralgias, back pain and neck pain.   Skin:  Negative for rash and wound.   Neurological:  Negative for dizziness, weakness, numbness and headaches.       Objective   BP (!) 160/104 (BP Location: Right arm, Patient Position: Sitting, Cuff Size: Standard)   Pulse 93   Temp 97.6 °F (36.4 °C) (Temporal)   Ht 5' 8.7\" (1.745 m)   Wt 79.9 kg (176 lb 3.2 oz)   SpO2 99%   BMI 26.25 kg/m²      Physical Exam  Constitutional:       General: She is not in acute distress.     Appearance: She is well-developed. She is not diaphoretic.   HENT:      Head: Normocephalic and atraumatic.      Right Ear: External ear normal.      Left Ear: External ear normal.      Nose: Nose normal.      Mouth/Throat:      Mouth: Mucous membranes are moist.      " Pharynx: No oropharyngeal exudate or posterior oropharyngeal erythema.     Eyes:      General:         Right eye: No discharge.         Left eye: No discharge.      Conjunctiva/sclera: Conjunctivae normal.      Pupils: Pupils are equal, round, and reactive to light.     Neck:      Thyroid: No thyromegaly.     Cardiovascular:      Rate and Rhythm: Normal rate and regular rhythm.      Heart sounds: Normal heart sounds. No murmur heard.     No friction rub. No gallop.   Pulmonary:      Effort: Pulmonary effort is normal. No respiratory distress.      Breath sounds: Normal breath sounds. No stridor. No wheezing or rales.   Abdominal:      General: Bowel sounds are normal. There is no distension.      Palpations: Abdomen is soft.      Tenderness: There is no abdominal tenderness.     Musculoskeletal:      Cervical back: Normal range of motion and neck supple.   Lymphadenopathy:      Cervical: No cervical adenopathy.     Skin:     General: Skin is warm and dry.      Findings: No erythema or rash.     Neurological:      Mental Status: She is alert and oriented to person, place, and time.     Psychiatric:         Behavior: Behavior normal.         Thought Content: Thought content normal.         Judgment: Judgment normal.

## 2025-08-05 ENCOUNTER — APPOINTMENT (OUTPATIENT)
Dept: LAB | Facility: CLINIC | Age: 75
End: 2025-08-05
Payer: MEDICARE

## 2025-08-05 DIAGNOSIS — R00.2 PALPITATIONS: ICD-10-CM

## 2025-08-05 DIAGNOSIS — E61.1 IRON DEFICIENCY: ICD-10-CM

## 2025-08-05 LAB
25(OH)D3 SERPL-MCNC: 33.5 NG/ML (ref 30–100)
ALBUMIN SERPL BCG-MCNC: 4.2 G/DL (ref 3.5–5)
ALP SERPL-CCNC: 55 U/L (ref 34–104)
ALT SERPL W P-5'-P-CCNC: 15 U/L (ref 7–52)
ANION GAP SERPL CALCULATED.3IONS-SCNC: 7 MMOL/L (ref 4–13)
AST SERPL W P-5'-P-CCNC: 20 U/L (ref 13–39)
BASOPHILS # BLD AUTO: 0.11 THOUSANDS/ÂΜL (ref 0–0.1)
BASOPHILS NFR BLD AUTO: 2 % (ref 0–1)
BILIRUB SERPL-MCNC: 0.88 MG/DL (ref 0.2–1)
BUN SERPL-MCNC: 20 MG/DL (ref 5–25)
CALCIUM SERPL-MCNC: 9.1 MG/DL (ref 8.4–10.2)
CHLORIDE SERPL-SCNC: 106 MMOL/L (ref 96–108)
CHOLEST SERPL-MCNC: 213 MG/DL (ref ?–200)
CO2 SERPL-SCNC: 28 MMOL/L (ref 21–32)
CREAT SERPL-MCNC: 0.81 MG/DL (ref 0.6–1.3)
EOSINOPHIL # BLD AUTO: 0.37 THOUSAND/ÂΜL (ref 0–0.61)
EOSINOPHIL NFR BLD AUTO: 6 % (ref 0–6)
ERYTHROCYTE [DISTWIDTH] IN BLOOD BY AUTOMATED COUNT: 13.5 % (ref 11.6–15.1)
FERRITIN SERPL-MCNC: 166 NG/ML (ref 30–307)
GFR SERPL CREATININE-BSD FRML MDRD: 71 ML/MIN/1.73SQ M
GLUCOSE P FAST SERPL-MCNC: 89 MG/DL (ref 65–99)
HCT VFR BLD AUTO: 40.9 % (ref 34.8–46.1)
HDLC SERPL-MCNC: 72 MG/DL
HGB BLD-MCNC: 13.1 G/DL (ref 11.5–15.4)
IMM GRANULOCYTES # BLD AUTO: 0.04 THOUSAND/UL (ref 0–0.2)
IMM GRANULOCYTES NFR BLD AUTO: 1 % (ref 0–2)
IRON SATN MFR SERPL: 48 % (ref 15–50)
IRON SERPL-MCNC: 130 UG/DL (ref 50–212)
LDLC SERPL CALC-MCNC: 120 MG/DL (ref 0–100)
LYMPHOCYTES # BLD AUTO: 1.41 THOUSANDS/ÂΜL (ref 0.6–4.47)
LYMPHOCYTES NFR BLD AUTO: 22 % (ref 14–44)
MCH RBC QN AUTO: 29.4 PG (ref 26.8–34.3)
MCHC RBC AUTO-ENTMCNC: 32 G/DL (ref 31.4–37.4)
MCV RBC AUTO: 92 FL (ref 82–98)
MONOCYTES # BLD AUTO: 0.43 THOUSAND/ÂΜL (ref 0.17–1.22)
MONOCYTES NFR BLD AUTO: 7 % (ref 4–12)
NEUTROPHILS # BLD AUTO: 4.01 THOUSANDS/ÂΜL (ref 1.85–7.62)
NEUTS SEG NFR BLD AUTO: 62 % (ref 43–75)
NONHDLC SERPL-MCNC: 141 MG/DL
NRBC BLD AUTO-RTO: 0 /100 WBCS
PLATELET # BLD AUTO: 261 THOUSANDS/UL (ref 149–390)
PMV BLD AUTO: 10.2 FL (ref 8.9–12.7)
POTASSIUM SERPL-SCNC: 4.3 MMOL/L (ref 3.5–5.3)
PROT SERPL-MCNC: 6.5 G/DL (ref 6.4–8.4)
RBC # BLD AUTO: 4.46 MILLION/UL (ref 3.81–5.12)
SODIUM SERPL-SCNC: 141 MMOL/L (ref 135–147)
TIBC SERPL-MCNC: 273 UG/DL (ref 250–450)
TRANSFERRIN SERPL-MCNC: 195 MG/DL (ref 203–362)
TRIGL SERPL-MCNC: 103 MG/DL (ref ?–150)
UIBC SERPL-MCNC: 143 UG/DL (ref 155–355)
WBC # BLD AUTO: 6.37 THOUSAND/UL (ref 4.31–10.16)

## 2025-08-05 PROCEDURE — 82728 ASSAY OF FERRITIN: CPT

## 2025-08-05 PROCEDURE — 83540 ASSAY OF IRON: CPT

## 2025-08-05 PROCEDURE — 83550 IRON BINDING TEST: CPT

## 2025-08-07 DIAGNOSIS — E78.5 HYPERLIPIDEMIA, UNSPECIFIED HYPERLIPIDEMIA TYPE: ICD-10-CM

## 2025-08-08 ENCOUNTER — OFFICE VISIT (OUTPATIENT)
Age: 75
End: 2025-08-08
Payer: MEDICARE

## 2025-08-08 VITALS
HEART RATE: 86 BPM | BODY MASS INDEX: 26.25 KG/M2 | TEMPERATURE: 98.7 F | OXYGEN SATURATION: 98 % | HEIGHT: 69 IN | SYSTOLIC BLOOD PRESSURE: 150 MMHG | DIASTOLIC BLOOD PRESSURE: 90 MMHG

## 2025-08-08 DIAGNOSIS — M85.89 OSTEOPENIA OF MULTIPLE SITES: ICD-10-CM

## 2025-08-08 DIAGNOSIS — F41.9 ANXIETY: ICD-10-CM

## 2025-08-08 DIAGNOSIS — E78.2 MIXED HYPERLIPIDEMIA: ICD-10-CM

## 2025-08-08 DIAGNOSIS — I10 ESSENTIAL HYPERTENSION: Primary | ICD-10-CM

## 2025-08-08 PROCEDURE — G2211 COMPLEX E/M VISIT ADD ON: HCPCS | Performed by: NURSE PRACTITIONER

## 2025-08-08 PROCEDURE — 99214 OFFICE O/P EST MOD 30 MIN: CPT | Performed by: NURSE PRACTITIONER

## 2025-08-08 RX ORDER — LOVASTATIN 20 MG/1
20 TABLET ORAL DAILY
Qty: 90 TABLET | Refills: 1 | Status: SHIPPED | OUTPATIENT
Start: 2025-08-08

## 2025-08-08 RX ORDER — AMLODIPINE BESYLATE 5 MG/1
5 TABLET ORAL DAILY
Qty: 90 TABLET | Refills: 1 | Status: SHIPPED | OUTPATIENT
Start: 2025-08-08